# Patient Record
Sex: FEMALE | Race: BLACK OR AFRICAN AMERICAN | NOT HISPANIC OR LATINO
[De-identification: names, ages, dates, MRNs, and addresses within clinical notes are randomized per-mention and may not be internally consistent; named-entity substitution may affect disease eponyms.]

---

## 2017-04-13 ENCOUNTER — APPOINTMENT (OUTPATIENT)
Dept: ORTHOPEDIC SURGERY | Facility: CLINIC | Age: 57
End: 2017-04-13

## 2017-04-13 VITALS — HEIGHT: 65 IN | SYSTOLIC BLOOD PRESSURE: 118 MMHG | DIASTOLIC BLOOD PRESSURE: 72 MMHG

## 2017-04-20 ENCOUNTER — APPOINTMENT (OUTPATIENT)
Dept: ORTHOPEDIC SURGERY | Facility: CLINIC | Age: 57
End: 2017-04-20

## 2017-04-20 VITALS — HEART RATE: 102 BPM | HEIGHT: 65 IN | SYSTOLIC BLOOD PRESSURE: 148 MMHG | DIASTOLIC BLOOD PRESSURE: 83 MMHG

## 2017-04-27 ENCOUNTER — APPOINTMENT (OUTPATIENT)
Dept: ORTHOPEDIC SURGERY | Facility: CLINIC | Age: 57
End: 2017-04-27

## 2017-04-27 VITALS — DIASTOLIC BLOOD PRESSURE: 84 MMHG | SYSTOLIC BLOOD PRESSURE: 136 MMHG | HEART RATE: 90 BPM

## 2018-03-29 ENCOUNTER — APPOINTMENT (OUTPATIENT)
Dept: ORTHOPEDIC SURGERY | Facility: CLINIC | Age: 58
End: 2018-03-29

## 2018-04-06 ENCOUNTER — APPOINTMENT (OUTPATIENT)
Dept: ORTHOPEDIC SURGERY | Facility: CLINIC | Age: 58
End: 2018-04-06
Payer: MEDICARE

## 2018-04-06 VITALS
DIASTOLIC BLOOD PRESSURE: 78 MMHG | WEIGHT: 240 LBS | SYSTOLIC BLOOD PRESSURE: 122 MMHG | BODY MASS INDEX: 39.99 KG/M2 | HEIGHT: 65 IN | HEART RATE: 90 BPM

## 2018-04-06 PROCEDURE — 72170 X-RAY EXAM OF PELVIS: CPT

## 2018-04-06 PROCEDURE — 73562 X-RAY EXAM OF KNEE 3: CPT | Mod: LT

## 2018-04-06 PROCEDURE — 99214 OFFICE O/P EST MOD 30 MIN: CPT

## 2018-04-26 ENCOUNTER — APPOINTMENT (OUTPATIENT)
Dept: ORTHOPEDIC SURGERY | Facility: CLINIC | Age: 58
End: 2018-04-26

## 2018-05-03 ENCOUNTER — APPOINTMENT (OUTPATIENT)
Dept: ORTHOPEDIC SURGERY | Facility: CLINIC | Age: 58
End: 2018-05-03

## 2018-05-10 ENCOUNTER — APPOINTMENT (OUTPATIENT)
Dept: ORTHOPEDIC SURGERY | Facility: CLINIC | Age: 58
End: 2018-05-10

## 2018-07-20 ENCOUNTER — APPOINTMENT (OUTPATIENT)
Dept: ORTHOPEDIC SURGERY | Facility: CLINIC | Age: 58
End: 2018-07-20
Payer: MEDICARE

## 2018-07-20 VITALS
SYSTOLIC BLOOD PRESSURE: 128 MMHG | DIASTOLIC BLOOD PRESSURE: 77 MMHG | HEIGHT: 65 IN | BODY MASS INDEX: 39.99 KG/M2 | WEIGHT: 240 LBS | HEART RATE: 94 BPM

## 2018-07-20 PROCEDURE — 20610 DRAIN/INJ JOINT/BURSA W/O US: CPT | Mod: LT

## 2018-07-27 ENCOUNTER — APPOINTMENT (OUTPATIENT)
Dept: ORTHOPEDIC SURGERY | Facility: CLINIC | Age: 58
End: 2018-07-27

## 2018-08-02 ENCOUNTER — APPOINTMENT (OUTPATIENT)
Dept: ORTHOPEDIC SURGERY | Facility: CLINIC | Age: 58
End: 2018-08-02

## 2018-08-03 ENCOUNTER — APPOINTMENT (OUTPATIENT)
Dept: ORTHOPEDIC SURGERY | Facility: CLINIC | Age: 58
End: 2018-08-03

## 2018-08-09 ENCOUNTER — APPOINTMENT (OUTPATIENT)
Dept: ORTHOPEDIC SURGERY | Facility: CLINIC | Age: 58
End: 2018-08-09
Payer: MEDICARE

## 2018-08-09 PROCEDURE — 20610 DRAIN/INJ JOINT/BURSA W/O US: CPT | Mod: LT

## 2018-08-16 ENCOUNTER — APPOINTMENT (OUTPATIENT)
Dept: ORTHOPEDIC SURGERY | Facility: CLINIC | Age: 58
End: 2018-08-16
Payer: MEDICARE

## 2018-08-16 PROCEDURE — 20610 DRAIN/INJ JOINT/BURSA W/O US: CPT | Mod: LT

## 2020-01-29 ENCOUNTER — NON-APPOINTMENT (OUTPATIENT)
Age: 60
End: 2020-01-29

## 2020-11-13 ENCOUNTER — APPOINTMENT (OUTPATIENT)
Dept: SURGICAL ONCOLOGY | Facility: CLINIC | Age: 60
End: 2020-11-13
Payer: MEDICAID

## 2020-11-13 VITALS
RESPIRATION RATE: 15 BRPM | OXYGEN SATURATION: 95 % | BODY MASS INDEX: 39.99 KG/M2 | HEART RATE: 108 BPM | DIASTOLIC BLOOD PRESSURE: 73 MMHG | WEIGHT: 240 LBS | HEIGHT: 65 IN | SYSTOLIC BLOOD PRESSURE: 114 MMHG

## 2020-11-13 PROCEDURE — 99205 OFFICE O/P NEW HI 60 MIN: CPT

## 2020-11-20 ENCOUNTER — OUTPATIENT (OUTPATIENT)
Dept: OUTPATIENT SERVICES | Facility: HOSPITAL | Age: 60
LOS: 1 days | End: 2020-11-20
Payer: MEDICAID

## 2020-11-20 ENCOUNTER — RESULT REVIEW (OUTPATIENT)
Age: 60
End: 2020-11-20

## 2020-11-20 DIAGNOSIS — C50.919 MALIGNANT NEOPLASM OF UNSPECIFIED SITE OF UNSPECIFIED FEMALE BREAST: ICD-10-CM

## 2020-11-20 PROCEDURE — 88321 CONSLTJ&REPRT SLD PREP ELSWR: CPT

## 2020-11-22 LAB — SURGICAL PATHOLOGY STUDY: SIGNIFICANT CHANGE UP

## 2020-11-27 ENCOUNTER — RESULT REVIEW (OUTPATIENT)
Age: 60
End: 2020-11-27

## 2020-11-27 ENCOUNTER — APPOINTMENT (OUTPATIENT)
Dept: MAMMOGRAPHY | Facility: IMAGING CENTER | Age: 60
End: 2020-11-27

## 2020-11-27 ENCOUNTER — OUTPATIENT (OUTPATIENT)
Dept: OUTPATIENT SERVICES | Facility: HOSPITAL | Age: 60
LOS: 1 days | End: 2020-11-27
Payer: COMMERCIAL

## 2020-11-27 DIAGNOSIS — D05.12 INTRADUCTAL CARCINOMA IN SITU OF LEFT BREAST: ICD-10-CM

## 2020-11-27 PROCEDURE — 88321 CONSLTJ&REPRT SLD PREP ELSWR: CPT

## 2020-11-29 NOTE — CONSULT LETTER
[Consult Letter:] : I had the pleasure of evaluating your patient, [unfilled]. [Please see my note below.] : Please see my note below. [Sincerely,] : Sincerely, [Dear  ___] : Dear  [unfilled], [FreeTextEntry3] : Clarence Cabello MD FACS\par

## 2020-11-29 NOTE — PHYSICAL EXAM
[Normal] : supple, no neck mass and thyroid not enlarged [Normal Neck Lymph Nodes] : normal neck lymph nodes  [Normal Supraclavicular Lymph Nodes] : normal supraclavicular lymph nodes [Normal Groin Lymph Nodes] : normal groin lymph nodes [Normal Axillary Lymph Nodes] : normal axillary lymph nodes [Normal] : oriented to person, place and time, with appropriate affect [de-identified] : No masses or adenopathy bilaterally

## 2020-11-29 NOTE — HISTORY OF PRESENT ILLNESS
[de-identified] : Ms. Reyes is a 59 y/o female who presents for an initial consultation for newly diagnosed left breast DCIS.\par \par Recent mammogram from 20 revealed suspicious calcifications in the upper inner quadrant. Stereotactic biopsy is recommended.  BI-RADS Category 4: suspicious\par \par Pathology report 10/7/20:\par Breast, core biopsies-\par A. Ductal carcinoma in situ, high grade, cribriform and micropapillary types, associated with calcifications.\par Fibrocystic changes associated with focal calcifications.\par B. Fibrocystic changes associated with calcium oxalate. \par \par No family history of breast or ovarian cancer.\par No prior breast biopsies.\par PMHx:\par Past brain stem surgery \par Family history of HTN\par Family history of breast cancer- 2nd cousin\par Menarche: 12 y/o\par LMP: N/A\par \par \par \par

## 2020-11-29 NOTE — ASSESSMENT
[FreeTextEntry1] : Left breast DCIS\par History of brain stem tumor\par I had a long discussion with the pt and her daughter regarding her diagnosis, prognosis and all management options.\par Recommend let breast lumpectomy under leslie  localization \par I had a long discussion with the pt and her daughter about all surgical options. \par Procedure discussed in detail\par All questions answered\par

## 2020-11-30 LAB — SURGICAL PATHOLOGY STUDY: SIGNIFICANT CHANGE UP

## 2020-12-01 ENCOUNTER — APPOINTMENT (OUTPATIENT)
Dept: SURGICAL ONCOLOGY | Facility: HOSPITAL | Age: 60
End: 2020-12-01

## 2020-12-11 ENCOUNTER — NON-APPOINTMENT (OUTPATIENT)
Age: 60
End: 2020-12-11

## 2021-01-05 ENCOUNTER — OUTPATIENT (OUTPATIENT)
Dept: OUTPATIENT SERVICES | Facility: HOSPITAL | Age: 61
LOS: 1 days | End: 2021-01-05
Payer: COMMERCIAL

## 2021-01-05 VITALS
SYSTOLIC BLOOD PRESSURE: 132 MMHG | DIASTOLIC BLOOD PRESSURE: 79 MMHG | RESPIRATION RATE: 16 BRPM | HEIGHT: 64 IN | OXYGEN SATURATION: 95 % | TEMPERATURE: 98 F | WEIGHT: 275.58 LBS | HEART RATE: 84 BPM

## 2021-01-05 DIAGNOSIS — G93.0 CEREBRAL CYSTS: Chronic | ICD-10-CM

## 2021-01-05 DIAGNOSIS — Z98.2 PRESENCE OF CEREBROSPINAL FLUID DRAINAGE DEVICE: Chronic | ICD-10-CM

## 2021-01-05 DIAGNOSIS — D05.10 INTRADUCTAL CARCINOMA IN SITU OF UNSPECIFIED BREAST: ICD-10-CM

## 2021-01-05 DIAGNOSIS — D05.12 INTRADUCTAL CARCINOMA IN SITU OF LEFT BREAST: ICD-10-CM

## 2021-01-05 DIAGNOSIS — Z01.818 ENCOUNTER FOR OTHER PREPROCEDURAL EXAMINATION: ICD-10-CM

## 2021-01-05 LAB
ALBUMIN SERPL ELPH-MCNC: 3.9 G/DL — SIGNIFICANT CHANGE UP (ref 3.3–5)
ALP SERPL-CCNC: 84 U/L — SIGNIFICANT CHANGE UP (ref 40–120)
ALT FLD-CCNC: 20 U/L — SIGNIFICANT CHANGE UP (ref 10–45)
ANION GAP SERPL CALC-SCNC: 9 MMOL/L — SIGNIFICANT CHANGE UP (ref 5–17)
AST SERPL-CCNC: 16 U/L — SIGNIFICANT CHANGE UP (ref 10–40)
BILIRUB SERPL-MCNC: 0.4 MG/DL — SIGNIFICANT CHANGE UP (ref 0.2–1.2)
BUN SERPL-MCNC: 16 MG/DL — SIGNIFICANT CHANGE UP (ref 7–23)
CALCIUM SERPL-MCNC: 9.4 MG/DL — SIGNIFICANT CHANGE UP (ref 8.4–10.5)
CHLORIDE SERPL-SCNC: 105 MMOL/L — SIGNIFICANT CHANGE UP (ref 96–108)
CO2 SERPL-SCNC: 29 MMOL/L — SIGNIFICANT CHANGE UP (ref 22–31)
CREAT SERPL-MCNC: 1.01 MG/DL — SIGNIFICANT CHANGE UP (ref 0.5–1.3)
GLUCOSE SERPL-MCNC: 94 MG/DL — SIGNIFICANT CHANGE UP (ref 70–99)
HCT VFR BLD CALC: 40.9 % — SIGNIFICANT CHANGE UP (ref 34.5–45)
HGB BLD-MCNC: 12.9 G/DL — SIGNIFICANT CHANGE UP (ref 11.5–15.5)
MCHC RBC-ENTMCNC: 29.9 PG — SIGNIFICANT CHANGE UP (ref 27–34)
MCHC RBC-ENTMCNC: 31.5 GM/DL — LOW (ref 32–36)
MCV RBC AUTO: 94.9 FL — SIGNIFICANT CHANGE UP (ref 80–100)
NRBC # BLD: 0 /100 WBCS — SIGNIFICANT CHANGE UP (ref 0–0)
PLATELET # BLD AUTO: 312 K/UL — SIGNIFICANT CHANGE UP (ref 150–400)
POTASSIUM SERPL-MCNC: 4.1 MMOL/L — SIGNIFICANT CHANGE UP (ref 3.5–5.3)
POTASSIUM SERPL-SCNC: 4.1 MMOL/L — SIGNIFICANT CHANGE UP (ref 3.5–5.3)
PROT SERPL-MCNC: 8.2 G/DL — SIGNIFICANT CHANGE UP (ref 6–8.3)
RBC # BLD: 4.31 M/UL — SIGNIFICANT CHANGE UP (ref 3.8–5.2)
RBC # FLD: 14.6 % — HIGH (ref 10.3–14.5)
SODIUM SERPL-SCNC: 143 MMOL/L — SIGNIFICANT CHANGE UP (ref 135–145)
WBC # BLD: 9.5 K/UL — SIGNIFICANT CHANGE UP (ref 3.8–10.5)
WBC # FLD AUTO: 9.5 K/UL — SIGNIFICANT CHANGE UP (ref 3.8–10.5)

## 2021-01-05 PROCEDURE — 93010 ELECTROCARDIOGRAM REPORT: CPT | Mod: NC

## 2021-01-05 PROCEDURE — 85027 COMPLETE CBC AUTOMATED: CPT

## 2021-01-05 PROCEDURE — 36415 COLL VENOUS BLD VENIPUNCTURE: CPT

## 2021-01-05 PROCEDURE — 80053 COMPREHEN METABOLIC PANEL: CPT

## 2021-01-05 PROCEDURE — 93005 ELECTROCARDIOGRAM TRACING: CPT

## 2021-01-05 PROCEDURE — G0463: CPT

## 2021-01-05 NOTE — H&P PST ADULT - NEGATIVE NEUROLOGICAL SYMPTOMS
no transient paralysis/no paresthesias/no generalized seizures/no focal seizures/no syncope/no tremors/no vertigo/no loss of sensation/no loss of consciousness/no hemiparesis/no confusion/no facial palsy

## 2021-01-05 NOTE — H&P PST ADULT - NSICDXPROBLEM_GEN_ALL_CORE_FT
PROBLEM DIAGNOSES  Problem: Intraductal carcinoma of breast  Assessment and Plan: Preop instructions provided including npo status, Hibiclens wash for infection control. Pt to c/w current meds, take bp meds in am dos, Pt. aware to stop any NSAIDS, OTC herbals or MVI on 1/12/21. Verbilized understanding. BW done, pending results. DVT prophylasix as per primary team. MC pending, form provided. will make appt. Aware to f/u on covid testing prior to sx as per pst protocol and will make appt. JB precautions/allergy norified to team.

## 2021-01-05 NOTE — H&P PST ADULT - NEGATIVE GENERAL SYMPTOMS
no fever/no chills/no sweating/no anorexia/no weight loss/no polyphagia/no polyuria/no polydipsia/no malaise/no fatigue

## 2021-01-05 NOTE — H&P PST ADULT - NSANTHOSAYNRD_GEN_A_CORE
Denies sleep studies done in the past/No. JB screening performed.  STOP BANG Legend: 0-2 = LOW Risk; 3-4 = INTERMEDIATE Risk; 5-8 = HIGH Risk

## 2021-01-05 NOTE — H&P PST ADULT - HISTORY OF PRESENT ILLNESS
y/o F presents to PST w/ a preop dx of intraductal carcinoma in situ of breast and to be evaluated for a scheduled left breast lumpectomy w/ leslie  localization on 1/19/21. Pt states had a mammo last 10/2020 and a lump noted, breast bx done and suspicious, pt referred to Dr Cabello who recommended surgical intervention at this time.  61 y/o F presents to PST w/ a preop dx of intraductal carcinoma in situ of breast and to be evaluated for a scheduled left breast lumpectomy w/ leslie  localization on 1/19/21. Pt states had a mammo last 10/2020 and a lump noted, breast bx done and suspicious, pt referred to Dr Cabello who recommended surgical intervention at this time.

## 2021-01-05 NOTE — H&P PST ADULT - COMMENTS
7/2020 Denies current covid S&S or in the past, test neg when done. Pt denies history of travel outside the country and outside [place] and denies COVID19 positive contacts within the last 14 days.

## 2021-01-05 NOTE — H&P PST ADULT - RS GEN PE MLT RESP DETAILS PC
airway patent/breath sounds equal/clear to auscultation bilaterally/no chest wall tenderness/no rales/no rhonchi/no subcutaneous emphysema/no wheezes snoring and deep respirations noted, States " I am fat that's why I breath like this"/airway patent/breath sounds equal/clear to auscultation bilaterally/no chest wall tenderness/no rales/no rhonchi/no subcutaneous emphysema/no wheezes/respirations labored

## 2021-01-12 ENCOUNTER — RESULT REVIEW (OUTPATIENT)
Age: 61
End: 2021-01-12

## 2021-01-12 ENCOUNTER — OUTPATIENT (OUTPATIENT)
Dept: OUTPATIENT SERVICES | Facility: HOSPITAL | Age: 61
LOS: 1 days | End: 2021-01-12
Payer: MEDICAID

## 2021-01-12 ENCOUNTER — APPOINTMENT (OUTPATIENT)
Dept: MAMMOGRAPHY | Facility: IMAGING CENTER | Age: 61
End: 2021-01-12
Payer: MEDICARE

## 2021-01-12 DIAGNOSIS — G93.0 CEREBRAL CYSTS: Chronic | ICD-10-CM

## 2021-01-12 DIAGNOSIS — Z00.8 ENCOUNTER FOR OTHER GENERAL EXAMINATION: ICD-10-CM

## 2021-01-12 DIAGNOSIS — Z98.2 PRESENCE OF CEREBROSPINAL FLUID DRAINAGE DEVICE: Chronic | ICD-10-CM

## 2021-01-12 PROBLEM — Z86.69 PERSONAL HISTORY OF OTHER DISEASES OF THE NERVOUS SYSTEM AND SENSE ORGANS: Chronic | Status: ACTIVE | Noted: 2021-01-05

## 2021-01-12 PROBLEM — I10 ESSENTIAL (PRIMARY) HYPERTENSION: Chronic | Status: ACTIVE | Noted: 2021-01-05

## 2021-01-12 PROCEDURE — C1739: CPT

## 2021-01-12 PROCEDURE — 19281 PERQ DEVICE BREAST 1ST IMAG: CPT

## 2021-01-12 PROCEDURE — 19281 PERQ DEVICE BREAST 1ST IMAG: CPT | Mod: LT

## 2021-01-16 ENCOUNTER — APPOINTMENT (OUTPATIENT)
Dept: DISASTER EMERGENCY | Facility: CLINIC | Age: 61
End: 2021-01-16

## 2021-01-18 ENCOUNTER — TRANSCRIPTION ENCOUNTER (OUTPATIENT)
Age: 61
End: 2021-01-18

## 2021-01-18 LAB — SARS-COV-2 N GENE NPH QL NAA+PROBE: NOT DETECTED

## 2021-01-19 ENCOUNTER — RESULT REVIEW (OUTPATIENT)
Age: 61
End: 2021-01-19

## 2021-01-19 ENCOUNTER — OUTPATIENT (OUTPATIENT)
Dept: OUTPATIENT SERVICES | Facility: HOSPITAL | Age: 61
LOS: 1 days | End: 2021-01-19
Payer: COMMERCIAL

## 2021-01-19 ENCOUNTER — APPOINTMENT (OUTPATIENT)
Dept: SURGICAL ONCOLOGY | Facility: HOSPITAL | Age: 61
End: 2021-01-19

## 2021-01-19 VITALS
HEART RATE: 81 BPM | RESPIRATION RATE: 16 BRPM | SYSTOLIC BLOOD PRESSURE: 111 MMHG | TEMPERATURE: 98 F | DIASTOLIC BLOOD PRESSURE: 75 MMHG | OXYGEN SATURATION: 93 %

## 2021-01-19 VITALS
RESPIRATION RATE: 18 BRPM | OXYGEN SATURATION: 97 % | WEIGHT: 275.58 LBS | HEIGHT: 64 IN | DIASTOLIC BLOOD PRESSURE: 79 MMHG | HEART RATE: 91 BPM | SYSTOLIC BLOOD PRESSURE: 150 MMHG | TEMPERATURE: 99 F

## 2021-01-19 DIAGNOSIS — D05.12 INTRADUCTAL CARCINOMA IN SITU OF LEFT BREAST: ICD-10-CM

## 2021-01-19 DIAGNOSIS — G93.0 CEREBRAL CYSTS: Chronic | ICD-10-CM

## 2021-01-19 DIAGNOSIS — D05.10 INTRADUCTAL CARCINOMA IN SITU OF UNSPECIFIED BREAST: ICD-10-CM

## 2021-01-19 DIAGNOSIS — N63.20 UNSPECIFIED LUMP IN THE LEFT BREAST, UNSPECIFIED QUADRANT: ICD-10-CM

## 2021-01-19 DIAGNOSIS — Z98.2 PRESENCE OF CEREBROSPINAL FLUID DRAINAGE DEVICE: Chronic | ICD-10-CM

## 2021-01-19 PROCEDURE — 14001 TIS TRNFR TRUNK 10.1-30SQCM: CPT

## 2021-01-19 PROCEDURE — 19301 PARTIAL MASTECTOMY: CPT

## 2021-01-19 PROCEDURE — 76098 X-RAY EXAM SURGICAL SPECIMEN: CPT

## 2021-01-19 PROCEDURE — 88305 TISSUE EXAM BY PATHOLOGIST: CPT | Mod: 26

## 2021-01-19 PROCEDURE — 19301 PARTIAL MASTECTOMY: CPT | Mod: LT

## 2021-01-19 PROCEDURE — 88305 TISSUE EXAM BY PATHOLOGIST: CPT

## 2021-01-19 PROCEDURE — 76098 X-RAY EXAM SURGICAL SPECIMEN: CPT | Mod: 26

## 2021-01-19 PROCEDURE — 88307 TISSUE EXAM BY PATHOLOGIST: CPT | Mod: 26

## 2021-01-19 PROCEDURE — 88307 TISSUE EXAM BY PATHOLOGIST: CPT

## 2021-01-19 RX ORDER — HYDROMORPHONE HYDROCHLORIDE 2 MG/ML
0.5 INJECTION INTRAMUSCULAR; INTRAVENOUS; SUBCUTANEOUS
Refills: 0 | Status: DISCONTINUED | OUTPATIENT
Start: 2021-01-19 | End: 2021-01-19

## 2021-01-19 RX ORDER — LISINOPRIL 2.5 MG/1
1 TABLET ORAL
Qty: 0 | Refills: 0 | DISCHARGE

## 2021-01-19 RX ORDER — NIFEDIPINE 30 MG
1 TABLET, EXTENDED RELEASE 24 HR ORAL
Qty: 0 | Refills: 0 | DISCHARGE

## 2021-01-19 RX ORDER — SODIUM CHLORIDE 9 MG/ML
1000 INJECTION, SOLUTION INTRAVENOUS
Refills: 0 | Status: DISCONTINUED | OUTPATIENT
Start: 2021-01-19 | End: 2021-01-19

## 2021-01-19 RX ORDER — ACETAMINOPHEN 500 MG
2 TABLET ORAL
Qty: 0 | Refills: 0 | DISCHARGE

## 2021-01-19 RX ORDER — ONDANSETRON 8 MG/1
4 TABLET, FILM COATED ORAL ONCE
Refills: 0 | Status: DISCONTINUED | OUTPATIENT
Start: 2021-01-19 | End: 2021-01-19

## 2021-01-19 RX ADMIN — SODIUM CHLORIDE 50 MILLILITER(S): 9 INJECTION, SOLUTION INTRAVENOUS at 11:57

## 2021-01-19 NOTE — ASU DISCHARGE PLAN (ADULT/PEDIATRIC) - CARE PROVIDER_API CALL
Clarence Cabello)  Surgery  [address] [place] [address]  Phone: [phone]  Fax: [phone]  Follow Up Time: 1 week

## 2021-01-19 NOTE — BRIEF OPERATIVE NOTE - NSICDXBRIEFPOSTOP_GEN_ALL_CORE_FT
POST-OP DIAGNOSIS:  Ductal carcinoma in situ (DCIS) of left breast 19-Jan-2021 11:44:15  Ghislaine Morales  Left breast mass 19-Jan-2021 11:41:51  Ghislaine Morales

## 2021-01-19 NOTE — BRIEF OPERATIVE NOTE - NSICDXBRIEFPREOP_GEN_ALL_CORE_FT
PRE-OP DIAGNOSIS:  Ductal carcinoma in situ (DCIS) of left breast 19-Jan-2021 11:43:35  Ghislaine Morales  Left breast mass 19-Jan-2021 11:41:39  Ghislaine Morales

## 2021-01-19 NOTE — ASU DISCHARGE PLAN (ADULT/PEDIATRIC) - CALL YOUR DOCTOR IF YOU HAVE ANY OF THE FOLLOWING:
Bleeding that does not stop/Pain not relieved by Medications/Fever greater than (need to indicate Fahrenheit or Celsius)/Wound/Surgical Site with redness, or foul smelling discharge or pus/Nausea and vomiting that does not stop Bleeding that does not stop/Swelling that gets worse/Pain not relieved by Medications/Fever greater than (need to indicate Fahrenheit or Celsius)/Wound/Surgical Site with redness, or foul smelling discharge or pus/Nausea and vomiting that does not stop

## 2021-01-22 PROBLEM — M19.90 UNSPECIFIED OSTEOARTHRITIS, UNSPECIFIED SITE: Chronic | Status: ACTIVE | Noted: 2021-01-19

## 2021-01-22 PROBLEM — G47.30 SLEEP APNEA, UNSPECIFIED: Chronic | Status: ACTIVE | Noted: 2021-01-19

## 2021-01-24 LAB — SURGICAL PATHOLOGY STUDY: SIGNIFICANT CHANGE UP

## 2021-01-29 ENCOUNTER — APPOINTMENT (OUTPATIENT)
Dept: SURGICAL ONCOLOGY | Facility: CLINIC | Age: 61
End: 2021-01-29
Payer: MEDICARE

## 2021-01-29 VITALS
BODY MASS INDEX: 39.99 KG/M2 | HEART RATE: 103 BPM | RESPIRATION RATE: 15 BRPM | WEIGHT: 240 LBS | SYSTOLIC BLOOD PRESSURE: 140 MMHG | DIASTOLIC BLOOD PRESSURE: 84 MMHG | HEIGHT: 65 IN | OXYGEN SATURATION: 95 %

## 2021-01-29 PROCEDURE — 99024 POSTOP FOLLOW-UP VISIT: CPT

## 2021-01-29 NOTE — REVIEW OF SYSTEMS
Phone call to mother, note faxed to [place] Dunwello and his work. Frieda Amin RN 3/15/2019 4:09 PM       [Negative] : Heme/Lymph

## 2021-01-30 NOTE — HISTORY OF PRESENT ILLNESS
[de-identified] : Ms. Reyes is a 59 y/o female who presents for an initial postop visit.  She was initially seen in consultation o 20 for newly diagnosed left breast DCIS.\par She is status post left breast lumpectomy on 21 for DCIS.  \par Final pathology revealed no residual DCIS with biopsy site changes.\par DCIS noted on the core biopsy was ER/WA+.\par \par Mammogram from 20 revealed suspicious calcifications in the upper inner quadrant. Stereotactic biopsy is recommended.  BI-RADS Category 4: suspicious\par \par Pathology report 10/7/20:\par Breast, core biopsies-\par A. Ductal carcinoma in situ, high grade, cribriform and micropapillary types, associated with calcifications.\par Fibrocystic changes associated with focal calcifications.\par B. Fibrocystic changes associated with calcium oxalate. \par \par No family history of breast or ovarian cancer.\par No prior breast biopsies.\par PMHx:\par Past brain stem surgery \par Family history of HTN\par Family history of breast cancer- 2nd cousin\par Menarche: 12 y/o\par LMP: N/A\par \par \par \par

## 2021-01-30 NOTE — ASSESSMENT
[FreeTextEntry1] : Left breast DCIS ER/DC+.\par Status post left breast lumpectomy on 1/19/21.  \par No residual DCIS on final pathology\par Will refer to medical oncology to discuss risk reduction hormonal therapy and to radiation oncology to discuss adjuvant radiation therapy\par Oncotype DCIS ordered\par RTO 3 months\par \par Enclosed pathology report

## 2021-02-11 ENCOUNTER — OUTPATIENT (OUTPATIENT)
Dept: OUTPATIENT SERVICES | Facility: HOSPITAL | Age: 61
LOS: 1 days | Discharge: HOME | End: 2021-02-11

## 2021-02-11 DIAGNOSIS — G93.0 CEREBRAL CYSTS: Chronic | ICD-10-CM

## 2021-02-11 DIAGNOSIS — Z85.3 PERSONAL HISTORY OF MALIGNANT NEOPLASM OF BREAST: ICD-10-CM

## 2021-02-11 DIAGNOSIS — Z98.2 PRESENCE OF CEREBROSPINAL FLUID DRAINAGE DEVICE: Chronic | ICD-10-CM

## 2021-02-16 ENCOUNTER — APPOINTMENT (OUTPATIENT)
Dept: HEMATOLOGY ONCOLOGY | Facility: CLINIC | Age: 61
End: 2021-02-16
Payer: MEDICARE

## 2021-02-16 PROCEDURE — 99205 OFFICE O/P NEW HI 60 MIN: CPT | Mod: 95

## 2021-02-16 RX ORDER — DICLOFENAC SODIUM 75 MG/1
75 TABLET, DELAYED RELEASE ORAL
Qty: 30 | Refills: 0 | Status: DISCONTINUED | COMMUNITY
Start: 2018-04-06 | End: 2021-02-16

## 2021-02-16 RX ORDER — HYDROCHLOROTHIAZIDE 12.5 MG/1
TABLET ORAL
Refills: 0 | Status: ACTIVE | COMMUNITY

## 2021-02-16 RX ORDER — LISINOPRIL 30 MG/1
TABLET ORAL
Refills: 0 | Status: ACTIVE | COMMUNITY

## 2021-02-16 RX ORDER — HYALURONATE SODIUM 20 MG/2 ML
20 SYRINGE (ML) INTRAARTICULAR
Qty: 1 | Refills: 0 | Status: DISCONTINUED | OUTPATIENT
Start: 2018-04-06 | End: 2021-02-16

## 2021-02-17 ENCOUNTER — APPOINTMENT (OUTPATIENT)
Dept: RADIATION ONCOLOGY | Facility: CLINIC | Age: 61
End: 2021-02-17

## 2021-02-24 ENCOUNTER — APPOINTMENT (OUTPATIENT)
Dept: RADIATION ONCOLOGY | Facility: CLINIC | Age: 61
End: 2021-02-24

## 2021-02-24 ENCOUNTER — APPOINTMENT (OUTPATIENT)
Dept: RADIATION ONCOLOGY | Facility: CLINIC | Age: 61
End: 2021-02-24
Payer: MEDICARE

## 2021-02-24 DIAGNOSIS — I10 ESSENTIAL (PRIMARY) HYPERTENSION: ICD-10-CM

## 2021-02-24 DIAGNOSIS — Z86.03 PERSONAL HISTORY OF NEOPLASM OF UNCERTAIN BEHAVIOR: ICD-10-CM

## 2021-02-24 PROCEDURE — 99205 OFFICE O/P NEW HI 60 MIN: CPT | Mod: 95

## 2021-03-23 NOTE — PHYSICAL EXAM
[General Appearance - Alert] : alert [General Appearance - In No Acute Distress] : in no acute distress [] : no respiratory distress [Oriented To Time, Place, And Person] : oriented to person, place, and time [de-identified] : Not done telehealth

## 2021-03-23 NOTE — REVIEW OF SYSTEMS
[Joint Pain] : joint pain [Muscle Weakness] : muscle weakness [Gait Disturbance] : gait disturbance [Anxiety] : anxiety [Negative] : Constitutional [Eye Pain] : no eye pain [Loss of Hearing] : no loss of hearing [Chest Pain] : no chest pain [Palpitations] : no palpitations [Shortness Of Breath] : no shortness of breath [Cough] : no cough [Abdominal Pain] : no abdominal pain [Vomiting] : no vomiting [Constipation] : no constipation [Diarrhea] : no diarrhea [Urinary Frequency] : no change in urinary frequency [Swollen Glands] : no swollen glands [FreeTextEntry9] : ILENE

## 2021-03-23 NOTE — HISTORY OF PRESENT ILLNESS
[Home] : at home, [unfilled] , at the time of the visit. [Medical Office: ([facility])___] : at the medical office located in  [Other:____] : [unfilled] [Verbal consent obtained from patient] : the patient, [unfilled] [FreeTextEntry1] :  61 y/o female seen with daughter Krzysztof\par \par Diagnosis: core biopsy high grade DCIS (uncertain amount of disease and margins), no residual disease seen at lumpectomy\par \par Oncologic history:  mammogram and sonogram on 7/10/20 with additional imaging left breast on 9/4/20 that showed suspicious calcifications in the upper inner quadrant. L 10:00 BIRADS 4, Biopsy DCIS %/DE 80%+.\par \par Pathology report 10/7/20:\par Breast, core biopsies-\par A. Ductal carcinoma in situ, high grade, cribriform and micropapillary types, associated with calcifications.\par Fibrocystic changes associated with focal calcifications.\par B. Fibrocystic changes associated with calcium oxalate. \par \par 1/19/21: left breast lumpectomy:  Final pathology( reviewed by [facility])  showed  Breast, left 12:00, lumpectomy: No residual ductal carcinoma in situ identified.  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 Denies breast pain. Able to care for self and perform most ADLs. Uses walker occasionally\par

## 2021-04-07 ENCOUNTER — NON-APPOINTMENT (OUTPATIENT)
Age: 61
End: 2021-04-07

## 2021-04-08 ENCOUNTER — NON-APPOINTMENT (OUTPATIENT)
Age: 61
End: 2021-04-08

## 2021-04-28 ENCOUNTER — NON-APPOINTMENT (OUTPATIENT)
Age: 61
End: 2021-04-28

## 2021-05-10 ENCOUNTER — OUTPATIENT (OUTPATIENT)
Dept: OUTPATIENT SERVICES | Facility: HOSPITAL | Age: 61
LOS: 1 days | Discharge: HOME | End: 2021-05-10

## 2021-05-10 DIAGNOSIS — Z85.3 PERSONAL HISTORY OF MALIGNANT NEOPLASM OF BREAST: ICD-10-CM

## 2021-05-10 DIAGNOSIS — G93.0 CEREBRAL CYSTS: Chronic | ICD-10-CM

## 2021-05-10 DIAGNOSIS — Z98.2 PRESENCE OF CEREBROSPINAL FLUID DRAINAGE DEVICE: Chronic | ICD-10-CM

## 2021-05-11 ENCOUNTER — NON-APPOINTMENT (OUTPATIENT)
Age: 61
End: 2021-05-11

## 2021-05-13 ENCOUNTER — APPOINTMENT (OUTPATIENT)
Dept: HEMATOLOGY ONCOLOGY | Facility: CLINIC | Age: 61
End: 2021-05-13
Payer: MEDICARE

## 2021-05-20 ENCOUNTER — APPOINTMENT (OUTPATIENT)
Dept: HEMATOLOGY ONCOLOGY | Facility: CLINIC | Age: 61
End: 2021-05-20
Payer: MEDICARE

## 2021-05-20 PROCEDURE — 99213 OFFICE O/P EST LOW 20 MIN: CPT | Mod: 95

## 2021-07-07 ENCOUNTER — OUTPATIENT (OUTPATIENT)
Dept: OUTPATIENT SERVICES | Facility: HOSPITAL | Age: 61
LOS: 1 days | Discharge: HOME | End: 2021-07-07

## 2021-07-07 DIAGNOSIS — Z85.3 PERSONAL HISTORY OF MALIGNANT NEOPLASM OF BREAST: ICD-10-CM

## 2021-07-07 DIAGNOSIS — Z98.2 PRESENCE OF CEREBROSPINAL FLUID DRAINAGE DEVICE: Chronic | ICD-10-CM

## 2021-07-07 DIAGNOSIS — G93.0 CEREBRAL CYSTS: Chronic | ICD-10-CM

## 2021-07-08 ENCOUNTER — APPOINTMENT (OUTPATIENT)
Dept: HEMATOLOGY ONCOLOGY | Facility: CLINIC | Age: 61
End: 2021-07-08

## 2021-08-03 ENCOUNTER — APPOINTMENT (OUTPATIENT)
Dept: HEMATOLOGY ONCOLOGY | Facility: CLINIC | Age: 61
End: 2021-08-03
Payer: MEDICARE

## 2021-08-06 ENCOUNTER — APPOINTMENT (OUTPATIENT)
Dept: SURGICAL ONCOLOGY | Facility: CLINIC | Age: 61
End: 2021-08-06
Payer: MEDICARE

## 2021-08-06 VITALS
RESPIRATION RATE: 15 BRPM | HEART RATE: 86 BPM | BODY MASS INDEX: 40.82 KG/M2 | SYSTOLIC BLOOD PRESSURE: 145 MMHG | DIASTOLIC BLOOD PRESSURE: 84 MMHG | OXYGEN SATURATION: 98 % | WEIGHT: 245 LBS | HEIGHT: 65 IN

## 2021-08-06 PROCEDURE — 99215 OFFICE O/P EST HI 40 MIN: CPT

## 2021-08-06 NOTE — ADDENDUM
[FreeTextEntry1] : I, Zakiya Gamble, acted solely as a scribe for Dr. Clarence Cabello on this date 08/06/2021.\par

## 2021-08-06 NOTE — ASSESSMENT
[FreeTextEntry1] : Left breast DCIS ER/MA+\par CABRERA\par Area of concern likely scarring secondary to lumpectomy\par Reassured patient that index of suspicion for recurrence is low\par Will get yearly breast imaging September 2021\par Pt wants genetic testing which was performed today \par RTO 3 months \par

## 2021-08-06 NOTE — CONSULT LETTER
[Dear  ___] : Dear  [unfilled], [Courtesy Letter:] : I had the pleasure of seeing your patient, [unfilled], in my office today. [Please see my note below.] : Please see my note below. [Sincerely,] : Sincerely, [FreeTextEntry3] : Clarence Cabello MD FACS

## 2021-08-06 NOTE — HISTORY OF PRESENT ILLNESS
[de-identified] : Ms. Reyes is a 60 y/o female who presents for a follow-up visit.  She was initially seen in consultation on 11/13/20 for newly diagnosed left breast DCIS.\par She is status post left breast lumpectomy on 1/19/21 for DCIS.  \par Final pathology revealed no residual DCIS with biopsy site changes. (ER+/MO+)\par Oncotype Dx: 11\par Patient is currently on Anastrozole as per Dr. Painter- tolerating well. \par \par Mammogram from 9/4/20 revealed suspicious calcifications in the upper inner quadrant. Stereotactic biopsy is recommended.  BI-RADS Category 4: suspicious\par \par Pathology report 10/7/20:\par Breast, core biopsies-\par A. Ductal carcinoma in situ, high grade, cribriform and micropapillary types, associated with calcifications.\par Fibrocystic changes associated with focal calcifications.\par B. Fibrocystic changes associated with calcium oxalate. \par \par No prior breast biopsies.\par PMHx: Past brain stem surgery \par Family history of HTN\par Family history of breast cancer- 2nd cousin

## 2021-08-06 NOTE — PHYSICAL EXAM
[de-identified] : Left breast lumpectomy scar well healed. normal nodularity from scarring. us negative fo any solid or cystic lesions. no dominant masses or adenopathy bilaterally.

## 2021-08-10 ENCOUNTER — OUTPATIENT (OUTPATIENT)
Dept: OUTPATIENT SERVICES | Facility: HOSPITAL | Age: 61
LOS: 1 days | Discharge: HOME | End: 2021-08-10

## 2021-08-10 DIAGNOSIS — Z85.3 PERSONAL HISTORY OF MALIGNANT NEOPLASM OF BREAST: ICD-10-CM

## 2021-08-10 DIAGNOSIS — Z98.2 PRESENCE OF CEREBROSPINAL FLUID DRAINAGE DEVICE: Chronic | ICD-10-CM

## 2021-08-10 DIAGNOSIS — G93.0 CEREBRAL CYSTS: Chronic | ICD-10-CM

## 2021-08-12 ENCOUNTER — APPOINTMENT (OUTPATIENT)
Dept: HEMATOLOGY ONCOLOGY | Facility: CLINIC | Age: 61
End: 2021-08-12
Payer: MEDICARE

## 2021-08-12 VITALS
WEIGHT: 240 LBS | TEMPERATURE: 98 F | BODY MASS INDEX: 39.94 KG/M2 | SYSTOLIC BLOOD PRESSURE: 149 MMHG | HEART RATE: 112 BPM | OXYGEN SATURATION: 93 % | RESPIRATION RATE: 16 BRPM | DIASTOLIC BLOOD PRESSURE: 87 MMHG

## 2021-08-12 PROCEDURE — 99213 OFFICE O/P EST LOW 20 MIN: CPT

## 2021-11-12 ENCOUNTER — NON-APPOINTMENT (OUTPATIENT)
Age: 61
End: 2021-11-12

## 2021-12-27 ENCOUNTER — OUTPATIENT (OUTPATIENT)
Dept: OUTPATIENT SERVICES | Facility: HOSPITAL | Age: 61
LOS: 1 days | Discharge: HOME | End: 2021-12-27

## 2021-12-27 DIAGNOSIS — G93.0 CEREBRAL CYSTS: Chronic | ICD-10-CM

## 2021-12-27 DIAGNOSIS — Z98.2 PRESENCE OF CEREBROSPINAL FLUID DRAINAGE DEVICE: Chronic | ICD-10-CM

## 2021-12-27 DIAGNOSIS — Z85.3 PERSONAL HISTORY OF MALIGNANT NEOPLASM OF BREAST: ICD-10-CM

## 2021-12-28 ENCOUNTER — APPOINTMENT (OUTPATIENT)
Dept: HEMATOLOGY ONCOLOGY | Facility: CLINIC | Age: 61
End: 2021-12-28
Payer: MEDICARE

## 2021-12-28 ENCOUNTER — APPOINTMENT (OUTPATIENT)
Dept: HEMATOLOGY ONCOLOGY | Facility: CLINIC | Age: 61
End: 2021-12-28

## 2021-12-30 ENCOUNTER — APPOINTMENT (OUTPATIENT)
Dept: HEMATOLOGY ONCOLOGY | Facility: CLINIC | Age: 61
End: 2021-12-30
Payer: MEDICARE

## 2021-12-30 PROCEDURE — 99215 OFFICE O/P EST HI 40 MIN: CPT | Mod: 95

## 2021-12-30 RX ORDER — ANASTROZOLE TABLETS 1 MG/1
1 TABLET ORAL DAILY
Qty: 30 | Refills: 3 | Status: DISCONTINUED | COMMUNITY
Start: 2021-02-16 | End: 2021-12-30

## 2021-12-30 NOTE — ASSESSMENT
[FreeTextEntry1] : 60 y/o woman with HTN, prior brainstem tumor 1991 resected, OA, now with L DCIS ER+IL+ s/p lumpectomy 1/19/21, doing well, presenting for follow up on adjuvant AI, doing well.\par \par -continue exemestane daily, doing well.\par -no symptoms suggestive of recurrence\par -continue to follow up with her breast surgeon Dr. Cabello  \par -next mammo/US scheduled for 1/3/2022\par -will get labs next visit\par -follow up in 2/2022 for clinical breast exam\par -I encouraged her to call me with any questions.

## 2021-12-30 NOTE — HISTORY OF PRESENT ILLNESS
[de-identified] : The patient had a routine mammogram and sonogram on 7/10/20 with additional imaging left breast on 9/4/20 that showed suspicious calcifications in the upper inner quadrant. L 10:00 biopsy DCIS %/[place] 80%+.\par \par Pathology report 10/7/20:\par Breast, core biopsies-\par A. Ductal carcinoma in situ, high grade, cribriform and micropapillary types, associated with calcifications.\par Fibrocystic changes associated with focal calcifications.\par B. Fibrocystic changes associated with calcium oxalate. \par \par She is status post left breast lumpectomy with Dr. Clarence Cabello on 1/19/21 for DCIS.  \par Final pathology revealed no residual DCIS with biopsy site changes.\par \par The patient met with radiation oncologist, low DCISion RT, therefore patient chose to defer radiation therapy.  \par \par Anastrozole started 4/2021.\par \par Family history of breast cancer- 2nd cousin. 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 [de-identified] : ER+ (100%)/AZ+ (80%) [de-identified] : \par Transfer of care.\par Because of Covid 19 pandemic we agreed to doing a virtual visit  today. The visit was conducted on [employer] platform.\par Verbal consent given on 12/28/2021 at 10:40AM  by YASIR MARIE, patient. \par Started endocrine therapy 4/2021, switched to exemestane since 8/12/21.  She states she is tolerating well with no complaints.  \par Left knee osteoarthritis slightly worse, was recommended to have knee replacement.  \par last saw Dr. Cabello 8/6/21, area of concern likely scarring secondary to lumpectomy \par mammo/US sched for 1/3/2021\par No new breast lumps or skin changes. \par Labs next visit\par \par HEALTH MAINTENANCE \par PCP Dr. Ramires, following for HTN\par ORTHO OA - gets knee injection every 6 months, not taking any oral medications for this currently\par Gyn: Dr. 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 LMP: "years ago"\par s/p COVID vaccine and booster

## 2022-01-06 ENCOUNTER — APPOINTMENT (OUTPATIENT)
Dept: GASTROENTEROLOGY | Facility: CLINIC | Age: 62
End: 2022-01-06

## 2022-02-18 ENCOUNTER — OUTPATIENT (OUTPATIENT)
Dept: OUTPATIENT SERVICES | Facility: HOSPITAL | Age: 62
LOS: 1 days | Discharge: HOME | End: 2022-02-18

## 2022-02-18 DIAGNOSIS — G93.0 CEREBRAL CYSTS: Chronic | ICD-10-CM

## 2022-02-18 DIAGNOSIS — Z98.2 PRESENCE OF CEREBROSPINAL FLUID DRAINAGE DEVICE: Chronic | ICD-10-CM

## 2022-02-18 DIAGNOSIS — Z85.3 PERSONAL HISTORY OF MALIGNANT NEOPLASM OF BREAST: ICD-10-CM

## 2022-02-22 ENCOUNTER — APPOINTMENT (OUTPATIENT)
Dept: HEMATOLOGY ONCOLOGY | Facility: CLINIC | Age: 62
End: 2022-02-22
Payer: MEDICARE

## 2022-02-22 VITALS
SYSTOLIC BLOOD PRESSURE: 119 MMHG | RESPIRATION RATE: 18 BRPM | OXYGEN SATURATION: 95 % | TEMPERATURE: 97.8 F | BODY MASS INDEX: 40.98 KG/M2 | HEIGHT: 65 IN | DIASTOLIC BLOOD PRESSURE: 75 MMHG | HEART RATE: 105 BPM | WEIGHT: 245.99 LBS

## 2022-02-22 PROCEDURE — 99214 OFFICE O/P EST MOD 30 MIN: CPT

## 2022-02-22 RX ORDER — NIFEDIPINE 90 MG/1
90 TABLET, EXTENDED RELEASE ORAL
Qty: 90 | Refills: 0 | Status: ACTIVE | COMMUNITY
Start: 2022-01-24

## 2022-02-22 NOTE — REVIEW OF SYSTEMS
[Negative] : Allergic/Immunologic [Joint Pain] : joint pain [FreeTextEntry9] : left knee pain - chronic

## 2022-02-22 NOTE — HISTORY OF PRESENT ILLNESS
[Disease: _____________________] : Disease: [unfilled] [T: ___] : T[unfilled] [AJCC Stage: ____] : AJCC Stage: [unfilled] [de-identified] : The patient had a routine mammogram and sonogram on 7/10/20 with additional imaging left breast on 9/4/20 that showed suspicious calcifications in the upper inner quadrant. L 10:00 biopsy DCIS %/[place] 80%+.\par \par Pathology report 10/7/20:\par Breast, core biopsies-\par A. Ductal carcinoma in situ, high grade, cribriform and micropapillary types, associated with calcifications.\par Fibrocystic changes associated with focal calcifications.\par B. Fibrocystic changes associated with calcium oxalate. \par \par She is status post left breast lumpectomy with Dr. Clarence Cabello on 1/19/21 for DCIS.  \par Final pathology revealed no residual DCIS with biopsy site changes.\par \par The patient met with radiation oncologist, low DCISion RT, therefore patient chose to defer radiation therapy.  \par \par Anastrozole started 4/2021 and then switched to exemestane b/c of dizziness and arthralgias. \par \par Family history of breast cancer- 2nd cousin. 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 [de-identified] : ER+ (100%)/PA+ (80%) [de-identified] : 2/22/22\par Patient presents today to rule out metastatic breast cancer and assess treatment toxicity - exemestane\par Started endocrine therapy 4/2021, switched to exemestane since 8/12/21.  She states she is tolerating well with no complaints.  \par Left knee osteoarthritis slightly worse, was recommended to have knee replacement - will not be doing\par last saw Dr. Cabello 8/6/21, area of concern likely scarring secondary to lumpectomy \par mammo/US sched for 3/2022\par No new breast lumps or skin changes. \par Patient denies any SOB, CP, abdominal pain, bone pain or headache.\par Patient denies any hotflashes, arthralgias, vaginal dryness, vaginal bleeding, hair loss, muscle cramps.\par Labs done w/ Pcp in January of 2022 - will obtain copy \par \par HEALTH MAINTENANCE \par PCP Dr. Ramires, following for HTN - bw done last mnth\par ORTHO OA - gets knee injection every 6 months, not taking any oral medications for this currently\par Gyn: Dr. Sears - sees annually, no vaginal bleeding or spotting\par BMD 7/16/21 normal\par GI colonoscopy never had one - consult scheduled w/ Dr. 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 LMP: "years ago"\par s/p COVID vaccine and booster

## 2022-02-22 NOTE — ASSESSMENT
[FreeTextEntry1] : 62 y/o woman with HTN, prior brainstem tumor 1991 resected, OA, now with L DCIS ER+OK+ s/p lumpectomy 1/19/21, doing well, presenting for follow up on adjuvant AI, doing well.\par \par -continue exemestane daily, doing well.\par - CABRERA x 1 year\par -continue to follow up with her breast surgeon Dr. Cabello  \par -next mammo/US scheduled 3/2022 - scheduled\par -obtain copies of bw from PCP for review \par - bone density - Normal 7/2021;  f/up 7/2023\par - colonoscopy due* consult scheduled\par - f/up 3 months \par \par Patient had an opportunity to have her questions asked and answered to her satisfaction.\par \par \par

## 2022-02-22 NOTE — PHYSICAL EXAM
[Ambulatory and capable of all self care but unable to carry out any work activities] : Status 2- Ambulatory and capable of all self care but unable to carry out any work activities. Up and about more than 50% of waking hours [Obese] : obese [Normal] : normal appearance, no rash, nodules, vesicles, ulcers, erythema [de-identified] : s/p left lumpectomy with surgical scars well healed;  superior to scar fullness (seroma?);  nodularity over surgical scar; Patient denies any breast masses, breast tenderness, skin changes or nipple discharge. [de-identified] : obese; difficult to examine for HSM  [de-identified] : deferred

## 2022-02-23 ENCOUNTER — APPOINTMENT (OUTPATIENT)
Dept: GASTROENTEROLOGY | Facility: CLINIC | Age: 62
End: 2022-02-23
Payer: MEDICARE

## 2022-02-23 VITALS
BODY MASS INDEX: 40.98 KG/M2 | SYSTOLIC BLOOD PRESSURE: 139 MMHG | DIASTOLIC BLOOD PRESSURE: 84 MMHG | WEIGHT: 246 LBS | HEART RATE: 97 BPM | HEIGHT: 65 IN

## 2022-02-23 DIAGNOSIS — R06.00 DYSPNEA, UNSPECIFIED: ICD-10-CM

## 2022-02-23 DIAGNOSIS — M25.472 EFFUSION, RIGHT ANKLE: ICD-10-CM

## 2022-02-23 DIAGNOSIS — Z12.11 ENCOUNTER FOR SCREENING FOR MALIGNANT NEOPLASM OF COLON: ICD-10-CM

## 2022-02-23 DIAGNOSIS — R12 HEARTBURN: ICD-10-CM

## 2022-02-23 DIAGNOSIS — Z12.12 ENCOUNTER FOR SCREENING FOR MALIGNANT NEOPLASM OF COLON: ICD-10-CM

## 2022-02-23 DIAGNOSIS — Z78.9 OTHER SPECIFIED HEALTH STATUS: ICD-10-CM

## 2022-02-23 DIAGNOSIS — M25.471 EFFUSION, RIGHT ANKLE: ICD-10-CM

## 2022-02-23 PROCEDURE — 99204 OFFICE O/P NEW MOD 45 MIN: CPT

## 2022-02-23 RX ORDER — CALCIUM CARBONATE 500 MG/1
TABLET, CHEWABLE ORAL
Refills: 0 | Status: ACTIVE | COMMUNITY

## 2022-02-23 RX ORDER — ACETAMINOPHEN 325 MG/1
TABLET, FILM COATED ORAL
Refills: 0 | Status: ACTIVE | COMMUNITY

## 2022-02-23 RX ORDER — NIFEDIPINE 20 MG/1
CAPSULE ORAL
Refills: 0 | Status: DISCONTINUED | COMMUNITY
End: 2022-02-23

## 2022-02-23 RX ORDER — NAPROXEN SODIUM 220 MG/1
220 CAPSULE, LIQUID FILLED ORAL
Refills: 0 | Status: ACTIVE | COMMUNITY

## 2022-02-23 NOTE — PHYSICAL EXAM
[General Appearance - Alert] : alert [General Appearance - In No Acute Distress] : in no acute distress [General Appearance - Well Nourished] : well nourished [General Appearance - Well Developed] : well developed [Sclera] : the sclera and conjunctiva were normal [Neck Appearance] : the appearance of the neck was normal [Neck Cervical Mass (___cm)] : no neck mass was observed [Jugular Venous Distention Increased] : there was no jugular-venous distention [Thyroid Diffuse Enlargement] : the thyroid was not enlarged [Thyroid Nodule] : there were no palpable thyroid nodules [Auscultation Breath Sounds / Voice Sounds] : lungs were clear to auscultation bilaterally [Heart Rate And Rhythm] : heart rate was normal and rhythm regular [Heart Sounds] : normal S1 and S2 [Heart Sounds Gallop] : no gallops [Murmurs] : no murmurs [Heart Sounds Pericardial Friction Rub] : no pericardial rub [Full Pulse] : the pedal pulses are present [Bowel Sounds] : normal bowel sounds [Abdomen Soft] : soft [Abdomen Tenderness] : non-tender [Abdomen Mass (___ Cm)] : no abdominal mass palpated [Abdomen Hernia] : no hernia was discovered [FreeTextEntry1] : epigastric scar (prior feeding tube) [Patient Refused] : rectal exam was refused by the patient [Cervical Lymph Nodes Enlarged Posterior Bilaterally] : posterior cervical [Cervical Lymph Nodes Enlarged Anterior Bilaterally] : anterior cervical [Supraclavicular Lymph Nodes Enlarged Bilaterally] : supraclavicular [Inguinal Lymph Nodes Enlarged Bilaterally] : inguinal [Nail Clubbing] : no clubbing  or cyanosis of the fingernails [Skin Color & Pigmentation] : normal skin color and pigmentation [Skin Turgor] : normal skin turgor [] : no rash [Impaired Insight] : insight and judgment were intact [Oriented To Time, Place, And Person] : oriented to person, place, and time [Affect] : the affect was normal

## 2022-02-23 NOTE — CONSULT LETTER
[Dear  ___] : Dear  [unfilled], [Please see my note below.] : Please see my note below. [Courtesy Letter:] : I had the pleasure of seeing your patient, [unfilled], in my office today. [Consult Closing:] : Thank you very much for allowing me to participate in the care of this patient.  If you have any questions, please do not hesitate to contact me. [Sincerely,] : Sincerely, [FreeTextEntry3] : Dar Yip M.D.\par

## 2022-02-23 NOTE — ASSESSMENT
[FreeTextEntry1] : 1.  Rule out colorectal neoplasm.\par 2.  Occasional heartburn, without alarm symptoms.\par 3.  Morbid obesity.\par 4.  Status post left lumpectomy for DCIS 2021.\par 5.  Status post brain stem tumor surgery 1991 with temporary feeding tube insertion; residual left hemiparesis.\par 6.  Hypertension.\par 7.  Osteoarthritis.\par 8.  Snores; exertional dyspnea with ankle edema--suspect sleep apnea, possible restrictive and/or obstructive lung disease and pulmonary hypertension. Also at increased risk for heart disease.\par 9.  Allergic to penicillin, sulfa.\par \par Plan:\par 1.  Extensive medical records reviewed.\par 2.  Forward recent labs for my review.\par 3.  Pulmonary evaluation--clearance will be needed for elective anesthesia.\par 4.  Agree with need for colorectal cancer screening--she is not a candidate for office anesthesia.  Once cleared by pulmonary, can be set up for procedure at the hospital with Dr. Baudilio Weiss (who would perform there on my behalf, as I do no work at the [facility]). Procedure, rationale, alternatives (including Cologuard), material risks, anesthesia plan, and MiraLAX prep instructions were reviewed and brochure given.\par

## 2022-02-23 NOTE — REVIEW OF SYSTEMS
[Shortness Of Breath] : shortness of breath [Wheezing] : wheezing [SOB on Exertion] : shortness of breath during exertion [Negative] : Heme/Lymph [As Noted in HPI] : as noted in HPI [Heartburn] : heartburn [FreeTextEntry9] : Joint pain, left knee pain

## 2022-02-23 NOTE — HISTORY OF PRESENT ILLNESS
[FreeTextEntry1] : Keila is referred for consideration of screening colonoscopy.  Other than occasional heartburn, for which she would take Tums or Pepto-Bismol, with relief, she denies GI symptoms.  She had undergone brainstem tumor surgery 1991 with temporary feeding tube insertion (residual left hemiparesis).  She underwent left lumpectomy for DCIS.  She reports heavy snoring, as well as dyspnea on minimal exertion.

## 2022-03-25 ENCOUNTER — APPOINTMENT (OUTPATIENT)
Dept: ULTRASOUND IMAGING | Facility: IMAGING CENTER | Age: 62
End: 2022-03-25

## 2022-03-25 ENCOUNTER — RESULT REVIEW (OUTPATIENT)
Age: 62
End: 2022-03-25

## 2022-03-25 ENCOUNTER — OUTPATIENT (OUTPATIENT)
Dept: OUTPATIENT SERVICES | Facility: HOSPITAL | Age: 62
LOS: 1 days | End: 2022-03-25
Payer: COMMERCIAL

## 2022-03-25 ENCOUNTER — APPOINTMENT (OUTPATIENT)
Dept: MAMMOGRAPHY | Facility: IMAGING CENTER | Age: 62
End: 2022-03-25
Payer: MEDICARE

## 2022-03-25 DIAGNOSIS — Z98.2 PRESENCE OF CEREBROSPINAL FLUID DRAINAGE DEVICE: Chronic | ICD-10-CM

## 2022-03-25 DIAGNOSIS — G93.0 CEREBRAL CYSTS: Chronic | ICD-10-CM

## 2022-03-25 DIAGNOSIS — Z00.8 ENCOUNTER FOR OTHER GENERAL EXAMINATION: ICD-10-CM

## 2022-03-25 PROCEDURE — G0279: CPT

## 2022-03-25 PROCEDURE — 77066 DX MAMMO INCL CAD BI: CPT | Mod: 26

## 2022-03-25 PROCEDURE — G0279: CPT | Mod: 26

## 2022-03-25 PROCEDURE — 76641 ULTRASOUND BREAST COMPLETE: CPT | Mod: 26,50

## 2022-03-25 PROCEDURE — 76641 ULTRASOUND BREAST COMPLETE: CPT

## 2022-03-25 PROCEDURE — 77066 DX MAMMO INCL CAD BI: CPT

## 2022-04-24 ENCOUNTER — EMERGENCY (EMERGENCY)
Facility: HOSPITAL | Age: 62
LOS: 0 days | Discharge: HOME | End: 2022-04-25
Attending: STUDENT IN AN ORGANIZED HEALTH CARE EDUCATION/TRAINING PROGRAM
Payer: MEDICARE

## 2022-04-24 VITALS
TEMPERATURE: 99 F | DIASTOLIC BLOOD PRESSURE: 90 MMHG | HEIGHT: 64 IN | RESPIRATION RATE: 19 BRPM | SYSTOLIC BLOOD PRESSURE: 181 MMHG | WEIGHT: 240.08 LBS | HEART RATE: 92 BPM | OXYGEN SATURATION: 96 %

## 2022-04-24 DIAGNOSIS — L03.115 CELLULITIS OF RIGHT LOWER LIMB: ICD-10-CM

## 2022-04-24 DIAGNOSIS — Z98.2 PRESENCE OF CEREBROSPINAL FLUID DRAINAGE DEVICE: Chronic | ICD-10-CM

## 2022-04-24 DIAGNOSIS — Z88.2 ALLERGY STATUS TO SULFONAMIDES: ICD-10-CM

## 2022-04-24 DIAGNOSIS — M25.571 PAIN IN RIGHT ANKLE AND JOINTS OF RIGHT FOOT: ICD-10-CM

## 2022-04-24 DIAGNOSIS — I10 ESSENTIAL (PRIMARY) HYPERTENSION: ICD-10-CM

## 2022-04-24 DIAGNOSIS — G93.0 CEREBRAL CYSTS: Chronic | ICD-10-CM

## 2022-04-24 DIAGNOSIS — Z98.890 OTHER SPECIFIED POSTPROCEDURAL STATES: Chronic | ICD-10-CM

## 2022-04-24 DIAGNOSIS — Z88.0 ALLERGY STATUS TO PENICILLIN: ICD-10-CM

## 2022-04-24 DIAGNOSIS — Z20.822 CONTACT WITH AND (SUSPECTED) EXPOSURE TO COVID-19: ICD-10-CM

## 2022-04-24 LAB
ALBUMIN SERPL ELPH-MCNC: 3.9 G/DL — SIGNIFICANT CHANGE UP (ref 3.3–5)
ALP SERPL-CCNC: 95 U/L — SIGNIFICANT CHANGE UP (ref 40–120)
ALT FLD-CCNC: 24 U/L — SIGNIFICANT CHANGE UP (ref 12–78)
ANION GAP SERPL CALC-SCNC: 5 MMOL/L — SIGNIFICANT CHANGE UP (ref 5–17)
APTT BLD: 32.5 SEC — SIGNIFICANT CHANGE UP (ref 27.5–35.5)
AST SERPL-CCNC: 17 U/L — SIGNIFICANT CHANGE UP (ref 15–37)
BASOPHILS # BLD AUTO: 0.11 K/UL — SIGNIFICANT CHANGE UP (ref 0–0.2)
BASOPHILS NFR BLD AUTO: 0.9 % — SIGNIFICANT CHANGE UP (ref 0–2)
BILIRUB SERPL-MCNC: 0.4 MG/DL — SIGNIFICANT CHANGE UP (ref 0.2–1.2)
BUN SERPL-MCNC: 15 MG/DL — SIGNIFICANT CHANGE UP (ref 7–23)
CALCIUM SERPL-MCNC: 9.6 MG/DL — SIGNIFICANT CHANGE UP (ref 8.5–10.1)
CHLORIDE SERPL-SCNC: 105 MMOL/L — SIGNIFICANT CHANGE UP (ref 96–108)
CO2 SERPL-SCNC: 30 MMOL/L — SIGNIFICANT CHANGE UP (ref 22–31)
CREAT SERPL-MCNC: 0.96 MG/DL — SIGNIFICANT CHANGE UP (ref 0.5–1.3)
D DIMER BLD IA.RAPID-MCNC: 240 NG/ML DDU — HIGH
EGFR: 67 ML/MIN/1.73M2 — SIGNIFICANT CHANGE UP
EOSINOPHIL # BLD AUTO: 0.19 K/UL — SIGNIFICANT CHANGE UP (ref 0–0.5)
EOSINOPHIL NFR BLD AUTO: 1.5 % — SIGNIFICANT CHANGE UP (ref 0–6)
FLUAV AG NPH QL: SIGNIFICANT CHANGE UP
FLUBV AG NPH QL: SIGNIFICANT CHANGE UP
GLUCOSE SERPL-MCNC: 99 MG/DL — SIGNIFICANT CHANGE UP (ref 70–99)
HCT VFR BLD CALC: 41.1 % — SIGNIFICANT CHANGE UP (ref 34.5–45)
HGB BLD-MCNC: 12.6 G/DL — SIGNIFICANT CHANGE UP (ref 11.5–15.5)
IMM GRANULOCYTES NFR BLD AUTO: 0.5 % — SIGNIFICANT CHANGE UP (ref 0–1.5)
INR BLD: 1.01 RATIO — SIGNIFICANT CHANGE UP (ref 0.88–1.16)
LYMPHOCYTES # BLD AUTO: 2.45 K/UL — SIGNIFICANT CHANGE UP (ref 1–3.3)
LYMPHOCYTES # BLD AUTO: 20 % — SIGNIFICANT CHANGE UP (ref 13–44)
MCHC RBC-ENTMCNC: 29.3 PG — SIGNIFICANT CHANGE UP (ref 27–34)
MCHC RBC-ENTMCNC: 30.7 G/DL — LOW (ref 32–36)
MCV RBC AUTO: 95.6 FL — SIGNIFICANT CHANGE UP (ref 80–100)
MONOCYTES # BLD AUTO: 0.96 K/UL — HIGH (ref 0–0.9)
MONOCYTES NFR BLD AUTO: 7.8 % — SIGNIFICANT CHANGE UP (ref 2–14)
NEUTROPHILS # BLD AUTO: 8.5 K/UL — HIGH (ref 1.8–7.4)
NEUTROPHILS NFR BLD AUTO: 69.3 % — SIGNIFICANT CHANGE UP (ref 43–77)
NRBC # BLD: 0 /100 WBCS — SIGNIFICANT CHANGE UP (ref 0–0)
NT-PROBNP SERPL-SCNC: 72 PG/ML — SIGNIFICANT CHANGE UP (ref 0–125)
PLATELET # BLD AUTO: 317 K/UL — SIGNIFICANT CHANGE UP (ref 150–400)
POTASSIUM SERPL-MCNC: 4.3 MMOL/L — SIGNIFICANT CHANGE UP (ref 3.5–5.3)
POTASSIUM SERPL-SCNC: 4.3 MMOL/L — SIGNIFICANT CHANGE UP (ref 3.5–5.3)
PROT SERPL-MCNC: 8.1 GM/DL — SIGNIFICANT CHANGE UP (ref 6–8.3)
PROTHROM AB SERPL-ACNC: 12 SEC — SIGNIFICANT CHANGE UP (ref 10.5–13.4)
RBC # BLD: 4.3 M/UL — SIGNIFICANT CHANGE UP (ref 3.8–5.2)
RBC # FLD: 14.6 % — HIGH (ref 10.3–14.5)
SARS-COV-2 RNA SPEC QL NAA+PROBE: SIGNIFICANT CHANGE UP
SODIUM SERPL-SCNC: 140 MMOL/L — SIGNIFICANT CHANGE UP (ref 135–145)
TROPONIN I, HIGH SENSITIVITY RESULT: 9 NG/L — SIGNIFICANT CHANGE UP
WBC # BLD: 12.27 K/UL — HIGH (ref 3.8–10.5)
WBC # FLD AUTO: 12.27 K/UL — HIGH (ref 3.8–10.5)

## 2022-04-24 PROCEDURE — 71275 CT ANGIOGRAPHY CHEST: CPT | Mod: 26,MA

## 2022-04-24 PROCEDURE — 93971 EXTREMITY STUDY: CPT | Mod: 26,RT

## 2022-04-24 PROCEDURE — 99285 EMERGENCY DEPT VISIT HI MDM: CPT

## 2022-04-24 PROCEDURE — 93010 ELECTROCARDIOGRAM REPORT: CPT

## 2022-04-24 NOTE — ED ADULT TRIAGE NOTE - CHIEF COMPLAINT QUOTE
Right ankle swelling since Friday with pain  HX brain sx, left breast lumpectomy- unsure if we can use arm, HTN, obesity  After ambulating to triage found low SOB with POX 88% which increased to 96% with breathing exercises- denies O2 use

## 2022-04-24 NOTE — ED PROVIDER NOTE - SKIN, MLM
Skin normal color for race, warm, dry and intact. Hyperpigmentation to R distal leg, no blisters or lesions.

## 2022-04-24 NOTE — ED PROVIDER NOTE - NS ED ROS FT
Constitutional: (-) Fever, (-) Chills  Skin: (-) Rashes  Eyes: (-) Visual changes, (-) Discharge, (-) Redness  Ears: (-) Hearing loss, (-)Tinnitus, (-) Ear pain  Nose: (-) Nasal congestion, (-) Runny nose  Mouth/Throat: (-) Sore throat  CV: (-) Chest pain  Resp: (-) Cough, (-) Shortness of breath, (-) Dyspnea on Exertion, (-) Wheezing  GI: (-) Abdominal pain, (-) Nausea, (-) Vomiting, (-) Diarrhea  : (-) Dysuria   MSK: (+) Myalgias, (+)Calf tenderness  Neuro: (-) Headache

## 2022-04-24 NOTE — ED PROVIDER NOTE - CLINICAL SUMMARY MEDICAL DECISION MAKING FREE TEXT BOX
62y Female with PMHx of HTN, right breast lumpectomy no chemo or radiation, brain sx in 1990s for cysts (walks with walker and has slurred speech at baseline since surgery) presents to the ER for swelling of ankles. Reports R ankle discoloration x 6 weeks with acute ankle pain/calf pain and swelling the last 2 days. Went to urgent care, O2 low (90-92), advised to come to ER for evaluation. Denies fever, chills, cough, chest pain, SOB, abdominal pain, n/v/d. Vital signs stable, exam as noted above. Will get labs, EKG, US, CT, reassess.

## 2022-04-24 NOTE — ED ADULT NURSE NOTE - OBJECTIVE STATEMENT
as per patient c/o R ankle swelling and worsening discoloration since friday. Patient sent from urgent care due to low pulse ox of 88% on RA. Patient endorsing some SOB. Denies fevers, chills and cough. Hx: HTN.

## 2022-04-24 NOTE — ED PROVIDER NOTE - PATIENT PORTAL LINK FT
You can access the FollowMyHealth Patient Portal offered by [facility] by registering at the following website: [email or web address]. By joining [employer]’s FollowMyHealth portal, you will also be able to view your health information using other applications (apps) compatible with our system.

## 2022-04-24 NOTE — ED PROVIDER NOTE - OBJECTIVE STATEMENT
62y Female with PMHx of HTN, right breast lumpectomy no chemo or radiation, brain sx in 1990s for cysts (walks with walker and has slurred speech at baseline since surgery) presents to the ER for swelling of ankles. Patient reports R ankle discoloration x 6 weeks with acute ankle pain/calf pain and swelling the last 2 days. Went to urgent care, O2 low (90-92), advised to come to ER for evaluation. Denies fever, chills, cough, chest pain, SOB, abdominal pain, n/v/d. Denies oxygen use at home.

## 2022-04-24 NOTE — ED PROVIDER NOTE - PROGRESS NOTE DETAILS
SIXTO SOSA: CTA negative, US negative for DVT. likely cellulitis? Tx keflex and dc home. I have discussed with the patient about the ED workup, lab results, diagnostics results, plan for discharge home, need for follow-up with primary care physician/specialists, and return precautions. At this time, the patient does not require further workup in the ED. The patient is subjectively feeling better and would like to be discharged home. The patient had the opportunity to ask questions and I have answered all inquiries. The patient verbalizes understanding and agreement with the plan. The patient is hemodynamically stable, clinically well-appearing, ambulatory, mentating well and ready for discharge home.

## 2022-04-24 NOTE — ED ADULT NURSE NOTE - NSIMPLEMENTINTERV_GEN_ALL_ED
Implemented All Fall Risk Interventions:  [place] to call system. Call bell, personal items and telephone within reach. Instruct patient to call for assistance. Room bathroom lighting operational. Non-slip footwear when patient is off stretcher. Physically safe environment: no spills, clutter or unnecessary equipment. Stretcher in lowest position, wheels locked, appropriate side rails in place. Provide visual cue, wrist band, yellow gown, etc. Monitor gait and stability. Monitor for mental status changes and reorient to person, place, and time. Review medications for side effects contributing to fall risk. Reinforce activity limits and safety measures with patient and family.

## 2022-04-25 VITALS
RESPIRATION RATE: 18 BRPM | DIASTOLIC BLOOD PRESSURE: 112 MMHG | SYSTOLIC BLOOD PRESSURE: 177 MMHG | OXYGEN SATURATION: 95 % | HEART RATE: 77 BPM | TEMPERATURE: 98 F

## 2022-04-25 RX ADMIN — Medication 450 MILLIGRAM(S): at 00:06

## 2022-04-27 RX ORDER — NIFEDIPINE 30 MG
0 TABLET, EXTENDED RELEASE 24 HR ORAL
Qty: 0 | Refills: 0 | DISCHARGE

## 2022-04-27 RX ORDER — LISINOPRIL 2.5 MG/1
0 TABLET ORAL
Qty: 0 | Refills: 0 | DISCHARGE

## 2022-04-27 RX ORDER — HYDROCHLOROTHIAZIDE 25 MG
0 TABLET ORAL
Qty: 0 | Refills: 0 | DISCHARGE

## 2022-05-24 ENCOUNTER — OUTPATIENT (OUTPATIENT)
Dept: OUTPATIENT SERVICES | Facility: HOSPITAL | Age: 62
LOS: 1 days | Discharge: HOME | End: 2022-05-24

## 2022-05-24 DIAGNOSIS — G93.0 CEREBRAL CYSTS: Chronic | ICD-10-CM

## 2022-05-24 DIAGNOSIS — Z98.890 OTHER SPECIFIED POSTPROCEDURAL STATES: Chronic | ICD-10-CM

## 2022-05-24 DIAGNOSIS — Z85.3 PERSONAL HISTORY OF MALIGNANT NEOPLASM OF BREAST: ICD-10-CM

## 2022-05-24 DIAGNOSIS — Z98.2 PRESENCE OF CEREBROSPINAL FLUID DRAINAGE DEVICE: Chronic | ICD-10-CM

## 2022-05-24 PROBLEM — I10 ESSENTIAL (PRIMARY) HYPERTENSION: Chronic | Status: ACTIVE | Noted: 2022-04-24

## 2022-05-31 ENCOUNTER — APPOINTMENT (OUTPATIENT)
Dept: HEMATOLOGY ONCOLOGY | Facility: CLINIC | Age: 62
End: 2022-05-31

## 2022-06-09 DIAGNOSIS — Z01.818 ENCOUNTER FOR OTHER PREPROCEDURAL EXAMINATION: ICD-10-CM

## 2022-06-13 ENCOUNTER — APPOINTMENT (OUTPATIENT)
Dept: GASTROENTEROLOGY | Facility: HOSPITAL | Age: 62
End: 2022-06-13

## 2022-06-14 ENCOUNTER — NON-APPOINTMENT (OUTPATIENT)
Age: 62
End: 2022-06-14

## 2022-06-20 ENCOUNTER — APPOINTMENT (OUTPATIENT)
Dept: ORTHOPEDIC SURGERY | Facility: CLINIC | Age: 62
End: 2022-06-20
Payer: MEDICARE

## 2022-06-20 VITALS
DIASTOLIC BLOOD PRESSURE: 87 MMHG | HEART RATE: 103 BPM | SYSTOLIC BLOOD PRESSURE: 164 MMHG | WEIGHT: 246 LBS | HEIGHT: 65 IN | OXYGEN SATURATION: 93 % | BODY MASS INDEX: 40.98 KG/M2

## 2022-06-20 PROCEDURE — 99204 OFFICE O/P NEW MOD 45 MIN: CPT | Mod: 25

## 2022-06-20 PROCEDURE — 73564 X-RAY EXAM KNEE 4 OR MORE: CPT | Mod: LT,RT

## 2022-06-20 PROCEDURE — 20611 DRAIN/INJ JOINT/BURSA W/US: CPT | Mod: RT,LT

## 2022-06-20 RX ORDER — HYALURONATE SODIUM, STABILIZED 88 MG/4 ML
88 SYRINGE (ML) INTRAARTICULAR
Qty: 2 | Refills: 0 | Status: ACTIVE | COMMUNITY
Start: 2022-06-20

## 2022-06-23 ENCOUNTER — APPOINTMENT (OUTPATIENT)
Dept: HEMATOLOGY ONCOLOGY | Facility: CLINIC | Age: 62
End: 2022-06-23

## 2022-06-23 PROCEDURE — 99442: CPT

## 2022-06-23 NOTE — ASSESSMENT
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 Cabello  \par -mammo/US up to date\par -bw reviewed from PCP\par -f/up 2 months \par \par

## 2022-06-23 NOTE — HISTORY OF PRESENT ILLNESS
[Home] : at home, [unfilled] , at the time of the visit. [Medical Office: ([facility])___] : at the medical office located in  [Verbal consent obtained from patient] : the patient, [unfilled] [de-identified] : The patient had a routine mammogram and sonogram on 7/10/20 with additional imaging left breast on 9/4/20 that showed suspicious calcifications in the upper inner quadrant. L 10:00 biopsy DCIS %/MA 80%+.\par \par Pathology report 10/7/20:\par Breast, core biopsies-\par A. Ductal carcinoma in situ, high grade, cribriform and micropapillary types, associated with calcifications.\par Fibrocystic changes associated with focal calcifications.\par B. Fibrocystic changes associated with calcium oxalate. \par \par She is status post left breast lumpectomy with Dr. Clarence Cabello on 1/19/21 for DCIS.  \par Final pathology revealed no residual DCIS with biopsy site changes.\par \par The patient met with radiation oncologist, low DCISion RT, therefore patient chose to defer radiation therapy.  \par \par Anastrozole started 4/2021 and then switched to exemestane b/c of dizziness and arthralgias. \par \par Family history of breast cancer- 2nd cousin. 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 [de-identified] : ER+ (100%)/MI+ (80%) [de-identified] : \par Patient presents today to rule out metastatic breast cancer and assess treatment toxicity - exemestane\par Started endocrine therapy 4/2021, switched to exemestane since 8/12/21.  She states she is tolerating well with no complaints.  \par Left knee osteoarthritis slightly worse, was recommended to have knee replacement - will not be doing; s/p cortisone injection\par last saw Dr. Cabello 8/6/21, area of concern likely scarring secondary to lumpectomy; reminded pt due to see in 8/2022\par mammo/US 3/25/22 BIRADS2\par Labs done w/ Pcp in January of 2022 - reviewed \par \par HEALTH MAINTENANCE \par PCP Dr. Ramires, following for HTN\par ORTHO OA - gets knee injection every 6 months, not taking any oral medications for this currently\par Gyn: Dr. Sears - sees annually, no vaginal bleeding or spotting\par BMD 7/16/21 normal\par GI Dr. 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 LMP: "years ago"\par s/p COVID vaccine and booster

## 2022-09-20 ENCOUNTER — APPOINTMENT (OUTPATIENT)
Dept: ORTHOPEDIC SURGERY | Facility: CLINIC | Age: 62
End: 2022-09-20

## 2022-09-20 PROCEDURE — 99214 OFFICE O/P EST MOD 30 MIN: CPT | Mod: 25

## 2022-09-20 PROCEDURE — 20611 DRAIN/INJ JOINT/BURSA W/US: CPT | Mod: 50

## 2022-09-22 NOTE — CONSULT LETTER
[Dear  ___] : Dear  [unfilled], [Courtesy Letter:] : I had the pleasure of seeing your patient, [unfilled], in my office today. [Please see my note below.] : Please see my note below. [Sincerely,] : Sincerely, [FreeTextEntry3] : Clarence Cabello MD FACS Upper/Lower

## 2022-09-23 NOTE — PROCEDURE
[de-identified] : Ultrasound Guided Injection \par Indication for ultrasound guidance: Ensure placement within the knee joint\par \par Utilizing the SETVIe portable ultrasound machine, the Linear 6cm 13-6 MHz transducer and sterile ultrasound gel, ultrasound guidance with the probe in the short axis, utilizing an in plane approach, was used for the following injection:\par \par \par Aspiration: Right knee joint.\par Indication: Effusion and arthritis\par \par A discussion was had with the patient regarding this procedure and all questions were answered. All risks, benefits and alternatives were discussed. These included but were not limited to bleeding, infection, allergic reaction and reaccumulation of fluid. A timeout was done to ensure correct side and pt agreed to the procedure.  Chlorhexidine was used to sterilize and prep the area in the supero-lateral aspect of the knee. .A 25-gauge needle was used to injection 3mL of 1% lidocaine without epinephrine.  An 18-gauge needle was then used to aspirate the knee joint and approximately 30 cc of yellow fluid was aspirated from the knee without complication, the same needle was used to inject 4cc of 1% lidocaine without epinephrine and 1cc of 40mg/ml methylprednisolone into the knee. A sterile bandage was then applied. The patient tolerated the procedure well. \par \par Aspiration: Left knee joint.\par Indication: Effusion and arthritis\par \par A discussion was had with the patient regarding this procedure and all questions were answered. All risks, benefits and alternatives were discussed. These included but were not limited to bleeding, infection, allergic reaction and reaccumulation of fluid. A timeout was done to ensure correct side and pt agreed to the procedure.  Chlorhexidine was used to sterilize and prep the area in the supero-lateral aspect of the knee. .A 25-gauge needle was used to injection 3mL of 1% lidocaine without epinephrine.  An 18-gauge needle was then used to aspirate the knee joint and approximately 30 cc of yellow fluid was aspirated from the knee without complication, the same needle was used to inject 4cc of 1% lidocaine without epinephrine and 1cc of 40mg/ml methylprednisolone into the knee. A sterile bandage was then applied. The patient tolerated the procedure well.

## 2022-09-23 NOTE — HISTORY OF PRESENT ILLNESS
[de-identified] : YASIR MARIE  is a 62 year old F who presents for initial visit with bilateral knee pain.  Pain is primarily located at the posterior knees. It began several years ago, without injury or trauma.  Pain is worse with flexion and weightbearing. She has had both hyaluronic acid and cortisone injections in the past with relief. Most recent injections were over 2 years ago. Patient notes she recently had cellulitis on the right knee, and has completed her course of antibiotics. No prior injury. Denies numbness, tingling, weakness of the lower extremities.

## 2022-09-23 NOTE — ADDENDUM
[FreeTextEntry1] : I, Skylar Ralph, assisted with documentation on 09/20/2022  acting as scribe for Dr. Amy Dong.\par \par I, Amy Dong MD, EdM, have read and attest that all the information, medical decision making and discharge instructions within are true and accurate.

## 2022-09-23 NOTE — DISCUSSION/SUMMARY
[de-identified] : Keila presents for follow up of bilateral knee osteoarthritis. Bilateral ultrasound guided aspiration and steroid injections provided today for acute pain. Patient tolerated the procedure well. I informed the patient that the numbing medicine in today's injection will last for about 4-6 hours. The steroid that was injected will start to work in 1 to 2 days, peak at 1-2 weeks, and may last up to 4-6 weeks.  Pain may worsen over the next 72 hours. She may return for Monovisc injection as needed.\par \par Amy Dong MD, EdM\par Sports Medicine [facility]\par Department of Orthopedics

## 2022-09-23 NOTE — PHYSICAL EXAM
[de-identified] : Constitutional: Well-nourished, well-developed, No acute distress\par Respiratory:  Good respiratory effort, no SOB\par Lymphatic: No regional lymphadenopathy, no lymphedema\par Psychiatric: Pleasant and normal affect, alert and oriented x3\par Skin: Clean dry and intact B/L UE/LE\par Musculoskeletal: normal except where as noted in regional exam\par \par Bilateral Knees:\par APPEARANCE: no marked deformities, no swelling or malalignment\par POSITIVE TENDERNESS:  medial joint line. \par ROM: full & pain with flexion\par SPECIAL TESTS: stable v/v stress. painless grind. neg Lachman's. neg ant/post drawer. pos Shahram's. \par NEURO: Normal sensation of LE, DTRs 2+/4 patella and achilles\par PULSES: 2+ DP/PT pulses\par B/L Hips: No asymmetry, malalignment, or swelling, Full ROM, 5/5 strength in flexion/ext, IR/ER, Abd/Add, Joints stable\par B/L Ankles: No asymmetry, malalignment, or swelling, Full ROM, 5/5 strength in DF/PF/Inv/Ev, Joints stable

## 2022-10-14 NOTE — H&P PST ADULT - VENOUS THROMBOEMBOLISM CURRENT STATUS
No
(1) other risk factor (includes escalating BMI, pack-years of smoking, diabetes requiring insulin, chemotherapy, female gender and length of surgery)/(2) malignancy (present or previous)

## 2022-10-19 ENCOUNTER — APPOINTMENT (OUTPATIENT)
Dept: CARE COORDINATION | Facility: HOME HEALTH | Age: 62
End: 2022-10-19

## 2022-10-19 DIAGNOSIS — R06.83 SNORING: ICD-10-CM

## 2022-10-19 DIAGNOSIS — Z00.00 ENCOUNTER FOR GENERAL ADULT MEDICAL EXAMINATION W/OUT ABNORMAL FINDINGS: ICD-10-CM

## 2022-10-19 DIAGNOSIS — E78.5 HYPERLIPIDEMIA, UNSPECIFIED: ICD-10-CM

## 2022-10-19 DIAGNOSIS — G81.94 HEMIPLEGIA, UNSPECIFIED AFFECTING LEFT NONDOMINANT SIDE: ICD-10-CM

## 2022-10-19 DIAGNOSIS — I10 ESSENTIAL (PRIMARY) HYPERTENSION: ICD-10-CM

## 2022-10-19 DIAGNOSIS — E66.01 MORBID (SEVERE) OBESITY DUE TO EXCESS CALORIES: ICD-10-CM

## 2022-10-19 DIAGNOSIS — Z87.898 PERSONAL HISTORY OF OTHER SPECIFIED CONDITIONS: ICD-10-CM

## 2022-10-19 PROCEDURE — 99349 HOME/RES VST EST MOD MDM 40: CPT | Mod: 95

## 2022-10-19 NOTE — COUNSELING
[Fall prevention counseling provided] : Fall prevention counseling provided [Potential consequences of obesity discussed] : Potential consequences of obesity discussed [Benefits of weight loss discussed] : Benefits of weight loss discussed [Encouraged to increase physical activity] : Encouraged to increase physical activity

## 2022-10-19 NOTE — ASU PATIENT PROFILE, ADULT - PMH
Speech Treatment Note    Today's date: 10/19/2022  Patient name: Wilmer Mosley  : 2020  MRN: [id number]  Referring provider: Ayesha Polanco MD  Dx:   Encounter Diagnosis     ICD-10-CM    1  Expressive language disorder  F80 1        Start Time: 0900  Stop Time: 945  Total time in clinic (min): 45 minutes    Visit Number:6    Subjective/Behavioral: Pt arrived on time to session with father  Pt transitioned back to room with ST and parent, parent was unable to transition out of therapy room this date due to pt behaviors  Pt was observed to initially elope to parent during first 5 minutes of session, ST transffered items to table top where pt participated, pt was then participated with ST in play on floor  1  Pt will produce environmental sounds during play(e g  moo, vroom, beep) in 4/5 opp  Pt indep produced "vroom" and "uh oh" this date  2  Pt will produce 4 new core words by end of POC to request, reject, or comment in /5 opp  -Given indirect and direct models pt produced "up", "more please", "want bubbles", and "help" this date  He indep stated bye bye upon parting therapy room  ST modeled all done across all activities however pt observed to throw items or push away when done, parent stated this is new at home as well this week  3  Pt will ID/Label age meenu vocab (e g  shapes, animals, common items, body parts) in /5 opp    -Pt imitated labels for "red", "pink", "crash", and "push" while engaged in play  He indep produced "eyes", "hat", "blue" and "bubbles"  Long Term Goals: Pt will improve expressive language skills to an age appropriate level    Care giver goal: improve his expressive abilities    Intervention certification [facility]:  Intervention certification PM:      Other:Patient's family member was present was present during today's session   and Discussed session and patient progress with caregiver/family member after today's session    Recommendations:Continue with Plan of Care- Transition parent out of room; cont build pt rapport History of cyst of brain    Hypertension     History of cyst of brain    Hypertension    OA (osteoarthritis)    Sleep apnea

## 2022-10-22 NOTE — HEALTH RISK ASSESSMENT
[Never] : Never [0-4] : 0-4 [No] : No [Never (0 pts)] : Never (0 points) [No falls in past year] : Patient reported no falls in the past year [0] : 2) Feeling down, depressed, or hopeless: Not at all (0) [PHQ-2 Negative - No further assessment needed] : PHQ-2 Negative - No further assessment needed [Audit-CScore] : 0 [[id number]] : 0

## 2022-10-22 NOTE — ASSESSMENT
[FreeTextEntry1] : Health Maintenance: declined flu shot . Fully vaccinated for Covid + booster. Will f/u with GI re. alternate screening if she is unable to obtain pulmonary clearance. A1C 2/2022 = 5.5

## 2022-10-22 NOTE — PHYSICAL EXAM
[Alert and Oriented x3] : oriented to person, place, and time [de-identified] : Physical exam limited ^ telehealth encounter no acute distress, well nourished and well-appearing.

## 2022-10-22 NOTE — HISTORY OF PRESENT ILLNESS
[Home] : at home, [unfilled] , at the time of the visit. [Medical Office: ([facility])___] : at the medical office located in  [Verbal consent obtained from patient] : the patient, [unfilled] [FreeTextEntry1] : "[employer] Assessment" [de-identified] : 62 year old female PMH DCIS s/p left lumpectomy , brain hemangioma s/p resection in 91 with residual left him, HTN, Morbid obesity, OA knees seen for follow up [employer] Annual Assessment. Pt is A&O x 3, present with daughter. Lives with family who provides prn assistance with ADLs/IADLs ^ left romelia. Pt ambulates with walker outdoors, more recently limited due to pain in knee, \par \par Currently interested in pulmonary eval ^ obesity and snoring- had colonoscopy planned pending pulmonary clearance. Last PAP done in 2021 (reported normal)\par

## 2022-10-31 ENCOUNTER — APPOINTMENT (OUTPATIENT)
Dept: ORTHOPEDIC SURGERY | Facility: CLINIC | Age: 62
End: 2022-10-31

## 2022-11-11 ENCOUNTER — APPOINTMENT (OUTPATIENT)
Dept: ORTHOPEDIC SURGERY | Facility: CLINIC | Age: 62
End: 2022-11-11
Payer: MEDICARE

## 2022-11-11 DIAGNOSIS — M17.12 UNILATERAL PRIMARY OSTEOARTHRITIS, LEFT KNEE: ICD-10-CM

## 2022-11-11 PROCEDURE — 20611 DRAIN/INJ JOINT/BURSA W/US: CPT | Mod: RT

## 2022-11-11 PROCEDURE — 99024 POSTOP FOLLOW-UP VISIT: CPT

## 2022-11-13 NOTE — PROCEDURE
[de-identified] : \par \par \par Ultrasound Guided Injection \par Indication for ultrasound guidance: Ensure placement within the knee joint\par \par Utilizing the [employer] II  portable ultrasound machine, the Linear 6cm 13-6 MHz transducer and sterile ultrasound gel, ultrasound guidance with the probe in the short axis, utilizing an in plane approach, was used for the following injection:\par \par \par Aspiration: Right knee joint.\par Indication: Effusion and arthritis\par \par A discussion was had with the patient regarding this procedure and all questions were answered. All risks, benefits and alternatives were discussed. These included but were not limited to bleeding, infection, allergic reaction and reaccumulation of fluid. A timeout was done to ensure correct side and pt agreed to the procedure.  Chlorhexidine was used to sterilize and prep the area in the supero-lateral aspect of the knee. .A 25-gauge needle was used to injection 3mL of 1% lidocaine without epinephrine.  An 18-gauge needle was then used to aspirate the knee joint and approximately 60 cc of yellow fluid was aspirated from the knee without complication, the same needle was used to inject 4cc of Monovisc ([id number]) into the knee. A sterile bandage was then applied. The patient tolerated the procedure well. \par  \par Aspiration: Left knee joint.\par Indication: Effusion and arthritis\par \par A discussion was had with the patient regarding this procedure and all questions were answered. All risks, benefits and alternatives were discussed. These included but were not limited to bleeding, infection, allergic reaction and reaccumulation of fluid. A timeout was done to ensure correct side and pt agreed to the procedure.  Chlorhexidine was used to sterilize and prep the area in the supero-lateral aspect of the knee. .A 25-gauge needle was used to injection 3mL of 1% lidocaine without epinephrine.  An 18-gauge needle was then used to aspirate the knee joint and approximately 30 cc of yellow fluid was aspirated from the knee without complication, the same needle was used to inject 4cc of Monovisc ([id number]) into the knee. A sterile bandage was then applied. The patient tolerated the procedure well.

## 2022-11-13 NOTE — PHYSICAL EXAM
[de-identified] : Constitutional: Well-nourished, well-developed, No acute distress\par Respiratory: Good respiratory effort, no SOB\par Lymphatic: No regional lymphadenopathy, no lymphedema\par Psychiatric: Pleasant and normal affect, alert and oriented x3\par Skin: Clean dry and intact B/L UE/LE\par Musculoskeletal: normal except where as noted in regional exam\par \par Bilateral Knees:\par APPEARANCE: no marked deformities, no swelling or malalignment\par POSITIVE TENDERNESS: medial joint line. \par ROM: full & pain with flexion\par SPECIAL TESTS: stable v/v stress. painless grind. neg Lachman's. neg ant/post drawer. pos Shahram's. \par NEURO: Normal sensation of LE, DTRs 2+/4 patella and achilles\par PULSES: 2+ DP/PT pulses\par B/L Hips: No asymmetry, malalignment, or swelling, Full ROM, 5/5 strength in flexion/ext, IR/ER, Abd/Add, Joints stable\par B/L Ankles: No asymmetry, malalignment, or swelling, Full ROM, 5/5 strength in DF/PF/Inv/Ev, Joints stable \par

## 2022-11-13 NOTE — DISCUSSION/SUMMARY
[de-identified] : Princess presents for follow up of bilateral knee osteoarthritis. Bilateral ultrasound guidedaspirations and Monovisc injections performed today. Risks and benefits of hyaluronic acid injections were discussed. Patient understands it may take up to a week to notice effects. Patient tolerated the procedure well. Follow up 3 months. \par \par Amy Dong MD, EdM\par Sports Medicine [facility]\par Department of Orthopedics

## 2022-11-13 NOTE — ASSESSMENT
[FreeTextEntry1] : Keila presents for follow up of bilateral knee osteoarthritis. Bilateral knee Monovisc injection provided today for pain relief. Patient tolerated the procedure well.

## 2022-11-13 NOTE — HISTORY OF PRESENT ILLNESS
[de-identified] : YASIR MARIE is a 62 year old F who presents for follow up visit with bilateral knee pain. Pain is primarily located at the posterior knees. It began several years ago, without injury or trauma. Pain is worse with flexion and weightbearing. She is known to have osteoarthritis. Patient was last seen 9/20/22 and had bilateral steroid injections with good relief for about a month.She continues to take Motrin and Tylenol arthritis's for pain relief. She presents today for hyaluronic acid injections.\par Denies numbness, tingling, weakness of the lower extremities.

## 2022-12-10 ENCOUNTER — EMERGENCY (EMERGENCY)
Facility: HOSPITAL | Age: 62
LOS: 0 days | Discharge: HOME | End: 2022-12-10
Attending: STUDENT IN AN ORGANIZED HEALTH CARE EDUCATION/TRAINING PROGRAM

## 2022-12-10 VITALS
HEIGHT: 65 IN | SYSTOLIC BLOOD PRESSURE: 161 MMHG | HEART RATE: 78 BPM | WEIGHT: 229.94 LBS | TEMPERATURE: 99 F | RESPIRATION RATE: 16 BRPM | DIASTOLIC BLOOD PRESSURE: 111 MMHG | OXYGEN SATURATION: 98 %

## 2022-12-10 VITALS
TEMPERATURE: 99 F | SYSTOLIC BLOOD PRESSURE: 150 MMHG | HEART RATE: 76 BPM | RESPIRATION RATE: 17 BRPM | OXYGEN SATURATION: 99 % | DIASTOLIC BLOOD PRESSURE: 88 MMHG

## 2022-12-10 DIAGNOSIS — Z98.890 OTHER SPECIFIED POSTPROCEDURAL STATES: Chronic | ICD-10-CM

## 2022-12-10 DIAGNOSIS — G51.0 BELL'S PALSY: ICD-10-CM

## 2022-12-10 DIAGNOSIS — Z20.822 CONTACT WITH AND (SUSPECTED) EXPOSURE TO COVID-19: ICD-10-CM

## 2022-12-10 DIAGNOSIS — R29.810 FACIAL WEAKNESS: ICD-10-CM

## 2022-12-10 DIAGNOSIS — R47.81 SLURRED SPEECH: ICD-10-CM

## 2022-12-10 DIAGNOSIS — Z98.2 PRESENCE OF CEREBROSPINAL FLUID DRAINAGE DEVICE: Chronic | ICD-10-CM

## 2022-12-10 DIAGNOSIS — Z88.0 ALLERGY STATUS TO PENICILLIN: ICD-10-CM

## 2022-12-10 DIAGNOSIS — G93.0 CEREBRAL CYSTS: Chronic | ICD-10-CM

## 2022-12-10 DIAGNOSIS — Z88.2 ALLERGY STATUS TO SULFONAMIDES: ICD-10-CM

## 2022-12-10 DIAGNOSIS — I10 ESSENTIAL (PRIMARY) HYPERTENSION: ICD-10-CM

## 2022-12-10 LAB
ALBUMIN SERPL ELPH-MCNC: 4 G/DL — SIGNIFICANT CHANGE UP (ref 3.3–5)
ALP SERPL-CCNC: 90 U/L — SIGNIFICANT CHANGE UP (ref 40–120)
ALT FLD-CCNC: 22 U/L — SIGNIFICANT CHANGE UP (ref 12–78)
ANION GAP SERPL CALC-SCNC: 4 MMOL/L — LOW (ref 5–17)
AST SERPL-CCNC: 15 U/L — SIGNIFICANT CHANGE UP (ref 15–37)
BASOPHILS # BLD AUTO: 0.06 K/UL — SIGNIFICANT CHANGE UP (ref 0–0.2)
BASOPHILS NFR BLD AUTO: 0.6 % — SIGNIFICANT CHANGE UP (ref 0–2)
BILIRUB SERPL-MCNC: 0.5 MG/DL — SIGNIFICANT CHANGE UP (ref 0.2–1.2)
BUN SERPL-MCNC: 14 MG/DL — SIGNIFICANT CHANGE UP (ref 7–23)
CALCIUM SERPL-MCNC: 9.7 MG/DL — SIGNIFICANT CHANGE UP (ref 8.5–10.1)
CHLORIDE SERPL-SCNC: 109 MMOL/L — HIGH (ref 96–108)
CO2 SERPL-SCNC: 30 MMOL/L — SIGNIFICANT CHANGE UP (ref 22–31)
CREAT SERPL-MCNC: 1.06 MG/DL — SIGNIFICANT CHANGE UP (ref 0.5–1.3)
EGFR: 59 ML/MIN/1.73M2 — LOW
EOSINOPHIL # BLD AUTO: 0.14 K/UL — SIGNIFICANT CHANGE UP (ref 0–0.5)
EOSINOPHIL NFR BLD AUTO: 1.4 % — SIGNIFICANT CHANGE UP (ref 0–6)
FLUAV AG NPH QL: SIGNIFICANT CHANGE UP
FLUBV AG NPH QL: SIGNIFICANT CHANGE UP
GLUCOSE SERPL-MCNC: 102 MG/DL — HIGH (ref 70–99)
HCT VFR BLD CALC: 40.5 % — SIGNIFICANT CHANGE UP (ref 34.5–45)
HGB BLD-MCNC: 12.4 G/DL — SIGNIFICANT CHANGE UP (ref 11.5–15.5)
IMM GRANULOCYTES NFR BLD AUTO: 0.5 % — SIGNIFICANT CHANGE UP (ref 0–0.9)
LYMPHOCYTES # BLD AUTO: 1.71 K/UL — SIGNIFICANT CHANGE UP (ref 1–3.3)
LYMPHOCYTES # BLD AUTO: 17.7 % — SIGNIFICANT CHANGE UP (ref 13–44)
MCHC RBC-ENTMCNC: 29.3 PG — SIGNIFICANT CHANGE UP (ref 27–34)
MCHC RBC-ENTMCNC: 30.6 G/DL — LOW (ref 32–36)
MCV RBC AUTO: 95.7 FL — SIGNIFICANT CHANGE UP (ref 80–100)
MONOCYTES # BLD AUTO: 0.68 K/UL — SIGNIFICANT CHANGE UP (ref 0–0.9)
MONOCYTES NFR BLD AUTO: 7 % — SIGNIFICANT CHANGE UP (ref 2–14)
NEUTROPHILS # BLD AUTO: 7.04 K/UL — SIGNIFICANT CHANGE UP (ref 1.8–7.4)
NEUTROPHILS NFR BLD AUTO: 72.8 % — SIGNIFICANT CHANGE UP (ref 43–77)
NRBC # BLD: 0 /100 WBCS — SIGNIFICANT CHANGE UP (ref 0–0)
PLATELET # BLD AUTO: 331 K/UL — SIGNIFICANT CHANGE UP (ref 150–400)
POTASSIUM SERPL-MCNC: 4 MMOL/L — SIGNIFICANT CHANGE UP (ref 3.5–5.3)
POTASSIUM SERPL-SCNC: 4 MMOL/L — SIGNIFICANT CHANGE UP (ref 3.5–5.3)
PROT SERPL-MCNC: 7.9 GM/DL — SIGNIFICANT CHANGE UP (ref 6–8.3)
RBC # BLD: 4.23 M/UL — SIGNIFICANT CHANGE UP (ref 3.8–5.2)
RBC # FLD: 14.5 % — SIGNIFICANT CHANGE UP (ref 10.3–14.5)
SARS-COV-2 RNA SPEC QL NAA+PROBE: SIGNIFICANT CHANGE UP
SODIUM SERPL-SCNC: 143 MMOL/L — SIGNIFICANT CHANGE UP (ref 135–145)
WBC # BLD: 9.68 K/UL — SIGNIFICANT CHANGE UP (ref 3.8–10.5)
WBC # FLD AUTO: 9.68 K/UL — SIGNIFICANT CHANGE UP (ref 3.8–10.5)

## 2022-12-10 PROCEDURE — 70498 CT ANGIOGRAPHY NECK: CPT | Mod: 26,MA

## 2022-12-10 PROCEDURE — 99285 EMERGENCY DEPT VISIT HI MDM: CPT

## 2022-12-10 PROCEDURE — 93010 ELECTROCARDIOGRAM REPORT: CPT

## 2022-12-10 PROCEDURE — 70496 CT ANGIOGRAPHY HEAD: CPT | Mod: 26,MA

## 2022-12-10 RX ORDER — VALACYCLOVIR 500 MG/1
1000 TABLET, FILM COATED ORAL ONCE
Refills: 0 | Status: DISCONTINUED | OUTPATIENT
Start: 2022-12-10 | End: 2022-12-10

## 2022-12-10 NOTE — ED ADULT TRIAGE NOTE - CHIEF COMPLAINT QUOTE
left side facial droop since yesterday . h/o brain tumor with left side weakness  and slurred speech. pt walks with a walker

## 2022-12-10 NOTE — ED PROVIDER NOTE - NS ED ROS FT
CONST: no fevers, no chills  EYES: no pain, no vision changes  ENT: no sore throat, no ear pain, no change in hearing, +facial droop  CV: no chest pain, no leg swelling  RESP: no shortness of breath, no cough  ABD: no abdominal pain, no nausea, no vomiting, no diarrhea  : no dysuria, no flank pain, no hematuria  MSK: no back pain, no extremity pain  NEURO: no headache or additional neurologic complaints  HEME: no easy bleeding  SKIN:  no rash

## 2022-12-10 NOTE — ED PROVIDER NOTE - OBJECTIVE STATEMENT
63 y/o F with PMH HTN, brain cyst s/p  shunt, presents to the ED with L facial droop. Per daughter, she talked to the patient on video chat yesterday morning and she did not have a facial droop. Last night patient was noted to have a facial droop by daughter. Patient has slurred speech at baseline but endorses she has been drooling more on the left. Patient denies any numbness, tingling, or weakness in other extremities. She denies headache. No changes in vision. Uses walker at baseline.

## 2022-12-10 NOTE — ED PROVIDER NOTE - CLINICAL SUMMARY MEDICAL DECISION MAKING FREE TEXT BOX
61 y/o F with PMH HTN, brain cyst s/p  shunt presenting to the ED with L facial droop. She is hypertensive upon arrival. She has +L facial droop with no other neuro deficit. Forehead appears spared. Given no other neuro deficit, likely bells palsy however will obtain CT/CTA to r/o CVA. Patient outside window for thrombolytics given sx ongoing x 24 hours.

## 2022-12-10 NOTE — ED ADULT NURSE NOTE - NSICDXPASTSURGICALHX_GEN_ALL_CORE_FT
PAST SURGICAL HISTORY:  Brain cyst 1991 removed    Brain cyst     H/O lumpectomy     S/P  shunt 1997

## 2022-12-10 NOTE — ED PROVIDER NOTE - NSFOLLOWUPINSTRUCTIONS_ED_ALL_ED_FT
You were seen in the ED and diagnosed with bell's palsy.    You should take prednisone and valtrex as prescribed.    Follow up with neurology in the office.    If you have new or worsening symptoms including numbness, tingling, weakness, please return emergently to the ED.

## 2022-12-10 NOTE — ED PROVIDER NOTE - PHYSICAL EXAMINATION
GENERAL: Awake, alert, NAD  HEENT: NC/AT, moist mucous membranes, PERRL, EOMI, +L facial droop, forehead not spared  LUNGS: CTAB, no wheezes or crackles   CARDIAC: RRR, no m/r/g  ABDOMEN: Soft, normal BS, non tender, non distended, no rebound, no guarding  BACK: No midline spinal tenderness, no CVA tenderness  EXT: No edema, no calf tenderness, 2+ DP pulses bilaterally, no deformities.  NEURO: A&Ox3. 5/5 strengths upper extremities, lower extremities with some weakness (at baseline)  SKIN: Warm and dry. No rash.  PSYCH: Normal affect.

## 2022-12-10 NOTE — ED PROVIDER NOTE - PATIENT PORTAL LINK FT
You can access the FollowMyHealth Patient Portal offered by [facility] by registering at the following website: [email or web address]. By joining [email or web address]’s FollowMyHealth portal, you will also be able to view your health information using other applications (apps) compatible with our system.

## 2022-12-10 NOTE — ED ADULT NURSE NOTE - NSICDXPASTMEDICALHX_GEN_ALL_CORE_FT
PAST MEDICAL HISTORY:  History of cyst of brain     HTN (hypertension)     Hypertension     OA (osteoarthritis)     Sleep apnea

## 2022-12-10 NOTE — ED PROVIDER NOTE - CARE PROVIDER_API CALL
Stacie Jorge  NEUROLOGY  [address] [place] [address]  Phone: [phone]  Fax: [phone]  Follow Up Time: 1-3 Days

## 2022-12-19 NOTE — ED ADULT NURSE NOTE - BEFAST EYES
no
No
V-Y Flap Text: The defect edges were debeveled with a #15 scalpel blade.  Given the location of the defect, shape of the defect and the proximity to free margins a V-Y flap was deemed most appropriate.  Using a sterile surgical marker, an appropriate advancement flap was drawn incorporating the defect and placing the expected incisions within the relaxed skin tension lines where possible.    The area thus outlined was incised deep to adipose tissue with a #15 scalpel blade.  The skin margins were undermined to an appropriate distance in all directions utilizing iris scissors.

## 2023-01-11 ENCOUNTER — APPOINTMENT (OUTPATIENT)
Dept: SURGICAL ONCOLOGY | Facility: CLINIC | Age: 63
End: 2023-01-11

## 2023-01-11 NOTE — ADDENDUM
[FreeTextEntry1] : I, Zakiya Gamble, acted solely as a scribe for Dr. Clarence Cabello on this date 01/11/2023.\par \par

## 2023-01-11 NOTE — ASSESSMENT
[FreeTextEntry1] : Left breast DCIS ER/AL+\par CABRERA\par Area of concern likely scarring secondary to lumpectomy\par Reassured patient that index of suspicion for recurrence is low\par Will get yearly breast imaging September 2021\par Pt wants genetic testing which was performed today \par RTO 3 months \par

## 2023-01-11 NOTE — PHYSICAL EXAM
[de-identified] : Left breast lumpectomy scar well healed. normal nodularity from scarring. us negative fo any solid or cystic lesions. no dominant masses or adenopathy bilaterally.

## 2023-01-11 NOTE — HISTORY OF PRESENT ILLNESS
[de-identified] : Patient is a 63 y/o female who presents for a follow-up visit. She is status post left breast lumpectomy on 1/19/21 for left breast DCIS, final pathology revealed no residual DCIS with biopsy site changes. (ER+/AL+)\par Oncotype Dx: 11\par Patient was previously on Anastrozole as per Dr. Painter, now switched to Exemestane. \par \par Her recent bilateral mammo/sono performed on 3/25/22 showed no mammographic or sonographic evidence of malignancy in either breast at this time. No evidence of changes of a suspicious nature in either breast since the prior images. (BI-RADS 2)\par \par Mammogram from 9/4/20 revealed suspicious calcifications in the upper inner quadrant. Stereotactic biopsy is recommended.  BI-RADS Category 4: suspicious\par \par Pathology report 10/7/20:\par Breast, core biopsies-\par A. Ductal carcinoma in situ, high grade, cribriform and micropapillary types, associated with calcifications.\par Fibrocystic changes associated with focal calcifications.\par B. Fibrocystic changes associated with calcium oxalate. \par \par No prior breast biopsies.\par PMHx: Past brain stem surgery \par Family history of HTN\par Family history of breast cancer- 2nd cousin

## 2023-01-31 ENCOUNTER — APPOINTMENT (OUTPATIENT)
Dept: HEMATOLOGY ONCOLOGY | Facility: CLINIC | Age: 63
End: 2023-01-31

## 2023-02-07 ENCOUNTER — OUTPATIENT (OUTPATIENT)
Dept: OUTPATIENT SERVICES | Facility: HOSPITAL | Age: 63
LOS: 1 days | Discharge: HOME | End: 2023-02-07

## 2023-02-07 DIAGNOSIS — Z15.09 GENETIC SUSCEPTIBILITY TO OTHER MALIGNANT NEOPLASM: ICD-10-CM

## 2023-02-07 DIAGNOSIS — Z98.2 PRESENCE OF CEREBROSPINAL FLUID DRAINAGE DEVICE: Chronic | ICD-10-CM

## 2023-02-07 DIAGNOSIS — Z98.890 OTHER SPECIFIED POSTPROCEDURAL STATES: Chronic | ICD-10-CM

## 2023-02-07 DIAGNOSIS — G93.0 CEREBRAL CYSTS: Chronic | ICD-10-CM

## 2023-02-14 ENCOUNTER — APPOINTMENT (OUTPATIENT)
Dept: HEMATOLOGY ONCOLOGY | Facility: CLINIC | Age: 63
End: 2023-02-14
Payer: MEDICARE

## 2023-02-14 PROCEDURE — 99203 OFFICE O/P NEW LOW 30 MIN: CPT | Mod: 95

## 2023-02-24 ENCOUNTER — APPOINTMENT (OUTPATIENT)
Dept: ORTHOPEDIC SURGERY | Facility: CLINIC | Age: 63
End: 2023-02-24
Payer: MEDICARE

## 2023-02-24 DIAGNOSIS — R20.2 ANESTHESIA OF SKIN: ICD-10-CM

## 2023-02-24 DIAGNOSIS — R20.0 ANESTHESIA OF SKIN: ICD-10-CM

## 2023-02-24 DIAGNOSIS — M18.12 UNILATERAL PRIMARY OSTEOARTHRITIS OF FIRST CARPOMETACARPAL JOINT, LEFT HAND: ICD-10-CM

## 2023-02-24 PROCEDURE — 73110 X-RAY EXAM OF WRIST: CPT | Mod: LT

## 2023-02-24 PROCEDURE — 20600 DRAIN/INJ JOINT/BURSA W/O US: CPT | Mod: FA

## 2023-02-24 PROCEDURE — 99214 OFFICE O/P EST MOD 30 MIN: CPT | Mod: 25

## 2023-02-24 PROCEDURE — SPL01: CPT | Mod: LT,25

## 2023-02-27 ENCOUNTER — APPOINTMENT (OUTPATIENT)
Dept: ORTHOPEDIC SURGERY | Facility: CLINIC | Age: 63
End: 2023-02-27
Payer: MEDICARE

## 2023-02-27 VITALS
BODY MASS INDEX: 39.99 KG/M2 | OXYGEN SATURATION: 94 % | DIASTOLIC BLOOD PRESSURE: 82 MMHG | HEIGHT: 65 IN | WEIGHT: 240 LBS | HEART RATE: 88 BPM | SYSTOLIC BLOOD PRESSURE: 153 MMHG

## 2023-02-27 PROCEDURE — 99214 OFFICE O/P EST MOD 30 MIN: CPT | Mod: 25

## 2023-02-27 PROCEDURE — 20611 DRAIN/INJ JOINT/BURSA W/US: CPT | Mod: RT

## 2023-03-08 NOTE — ADDENDUM
[FreeTextEntry1] : 02/21/2023\par Left voicemail for patient requesting call back to follow-up on the items discussed at her Genetics visit. \par \par Hamilton Armstrong MS, [facility]\par Genetic Counselor

## 2023-03-08 NOTE — ASSESSMENT
[FreeTextEntry1] : The visit was provided via telehealth using real-time 2-way audio visual technology. The patient, Keila Martin, was located at home, [address] [place] [address], at the time of the visit. The Genetic Counselor, Hamilton Armstrong, and the Physician, Dr. Sacha Martinez, were located at the medical office located in [place], [place] at the time of the visit. The patient, Keila Martin, and Provider participated in the telehealth encounter. Her son-in-law and daughter also participated. Consent for telehealth services was given on 2023 by the patient, Keila Martin. \par \par REASON FOR CONSULT\par Keila Martin is a 62-year-old female who was referred by Dr. 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 She was accompanied by her son-in-law Codey, and then later in the call her daughter also joined. \par \par RELEVANT MEDICAL HISTORY\par Ms. Martin was diagnosed with left breast cancer in 10/2020 at age 60. She was treated with lumpectomy 2021, Anastrozole briefly and switched to Exemestane (plan for 5 years). Patient declined radiation therapy at that time. \par \par Ms. 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 This test was ordered by Dr. Cabello and reported on 2021. 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 \par \par OTHER MEDICAL AND SURGICAL HISTORY:\par Arthritis (left knee). HTN. \par \par PAST OB/GYN HISTORY:\par Obstetrical History: \par Age at Menarche: 11\par Menopausal with LMP at age [age]\par Age at First Live Birth: 19\par Oral Contraceptive Use: No\par Hormone Replacement Therapy: No\par \par CANCER SCREENING HISTORY:  \par Breast: Last mammogram and sonogram 2022, heterogeneously dense breasts noted, otherwise normal. Frequency: yearly. \par GYN: Last visit , normal. Frequency: yearly. \par Colon: None. \par Skin: Last exam 10/2022, noncancerous lesion reportedly removed from stomach. She has a follow-up appointment in 2023. \par \par SOCIAL HISTORY:\par •	Tobacco-product use: No\par \par FAMILY HISTORY:\par Maternal ancestry was reported as Swiss and [place], and paternal ancestry was reported as [place] and Chinese. No Ashkenazi Sikh heritage was reported. A detailed family history of cancer was ascertained, see below and scanned chart for pedigree. \par \par To Ms. Martin’s knowledge, no one in the family has had germline testing for cancer susceptibility.  \par RESULTS INTERPRETATION AND ASSESSMENT:\par We reviewed with Ms. 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 This variant was detected at an allele frequency of 20-30%. It is unclear at this time if Ms. Martin has truly mosaic Li-Fraumeni Syndrome (LFS) or if this mosaic TP53 is a result of clonal hematopoiesis of indeterminate potential (CHIP) or an underlying pre-leukemic or leukemic state.\par \par In order to clarify whether Ms. Martin has germline mosaic LFS or not, we discussed testing an additional sample type via skin biopsy. If a skin biopsy is pursued and the same TP53 mutation is identified, we would confirm constitutional mosaicism. However, if the skin biopsy is negative, we would think the TP53 mutation originally detected is most likely due to CHIP. Ms. Martin noted that she is unlikely to pursue a skin biopsy but will discuss it with her family further and let us know once she has made a decision. \par \par IMPLICATIONS FOR FAMILY MEMBERS:\par We recommended Ms. Martin’s son and daughter pursue genetic testing for TP53 as there may be up to a 50% chance that they have the same gene mutation if the TP53 mutation is truly mosaic in Ms. Martin. Ms. Martin’s daughter who joined the call later on stated they will discuss as a family and let us know whether they would like to pursue genetic testing. We emphasized the importance of knowing whether her children have a germline TP53 mutation as it will significantly impact their medical management. \par \par We will reach out to Ms. Martin next week to see if they have decided regarding the punch biopsy and genetic testing for her children. \par \par PLAN:\par 1.	See above note for recommended management.\par 2.	We will follow-up with Ms. Martin in one week once they have had a chance as a family to discuss the information provided today. \par \par For any additional questions please call Cancer Genetics at [phone]. \par \par \par Hamilton Armstrong, MS, [facility]\par Genetic Counselor, Cancer Genetics\par \par \par Attending Attestation:\par \par I have reviewed and edited the genetic counselor's note and I agree with the assessment and plan as documented. I spent approximately 30 minutes in total time of which approximately 15 minutes was face-to-face (via 2-way audiovisual telemedicine connection) with Ms. Martin reviewing her relevant personal and family history, the genetic testing results, our risk-assessment, and options for future cancer risk-reduction for the patient and her relevant family members. Over half this time was spent in counseling and coordination of care.\par \par Sacha Martinez MD\par Chief, Cancer Genetics\par [facility] [place]\par \par \par CC: \par Patient\par Dr. Clarence Cabello

## 2023-04-12 ENCOUNTER — OUTPATIENT (OUTPATIENT)
Dept: OUTPATIENT SERVICES | Facility: HOSPITAL | Age: 63
LOS: 1 days | End: 2023-04-12
Payer: COMMERCIAL

## 2023-04-12 ENCOUNTER — APPOINTMENT (OUTPATIENT)
Dept: MAMMOGRAPHY | Facility: IMAGING CENTER | Age: 63
End: 2023-04-12
Payer: MEDICARE

## 2023-04-12 ENCOUNTER — RESULT REVIEW (OUTPATIENT)
Age: 63
End: 2023-04-12

## 2023-04-12 ENCOUNTER — APPOINTMENT (OUTPATIENT)
Dept: ULTRASOUND IMAGING | Facility: IMAGING CENTER | Age: 63
End: 2023-04-12

## 2023-04-12 DIAGNOSIS — Z98.2 PRESENCE OF CEREBROSPINAL FLUID DRAINAGE DEVICE: Chronic | ICD-10-CM

## 2023-04-12 DIAGNOSIS — D05.12 INTRADUCTAL CARCINOMA IN SITU OF LEFT BREAST: ICD-10-CM

## 2023-04-12 DIAGNOSIS — G93.0 CEREBRAL CYSTS: Chronic | ICD-10-CM

## 2023-04-12 DIAGNOSIS — Z98.890 OTHER SPECIFIED POSTPROCEDURAL STATES: Chronic | ICD-10-CM

## 2023-04-12 PROCEDURE — 76641 ULTRASOUND BREAST COMPLETE: CPT

## 2023-04-12 PROCEDURE — 77066 DX MAMMO INCL CAD BI: CPT | Mod: 26

## 2023-04-12 PROCEDURE — 76641 ULTRASOUND BREAST COMPLETE: CPT | Mod: 26,50

## 2023-04-12 PROCEDURE — G0279: CPT

## 2023-04-12 PROCEDURE — 77066 DX MAMMO INCL CAD BI: CPT

## 2023-04-12 PROCEDURE — G0279: CPT | Mod: 26

## 2023-05-04 ENCOUNTER — OUTPATIENT (OUTPATIENT)
Dept: OUTPATIENT SERVICES | Facility: HOSPITAL | Age: 63
LOS: 1 days | Discharge: HOME | End: 2023-05-04

## 2023-05-04 DIAGNOSIS — Z15.09 GENETIC SUSCEPTIBILITY TO OTHER MALIGNANT NEOPLASM: ICD-10-CM

## 2023-05-04 DIAGNOSIS — Z98.890 OTHER SPECIFIED POSTPROCEDURAL STATES: Chronic | ICD-10-CM

## 2023-05-04 DIAGNOSIS — Z98.2 PRESENCE OF CEREBROSPINAL FLUID DRAINAGE DEVICE: Chronic | ICD-10-CM

## 2023-05-04 DIAGNOSIS — G93.0 CEREBRAL CYSTS: Chronic | ICD-10-CM

## 2023-05-15 ENCOUNTER — APPOINTMENT (OUTPATIENT)
Dept: HEMATOLOGY ONCOLOGY | Facility: CLINIC | Age: 63
End: 2023-05-15
Payer: MEDICARE

## 2023-05-15 VITALS
TEMPERATURE: 98.1 F | WEIGHT: 281.31 LBS | DIASTOLIC BLOOD PRESSURE: 72 MMHG | HEIGHT: 65 IN | RESPIRATION RATE: 16 BRPM | SYSTOLIC BLOOD PRESSURE: 130 MMHG | HEART RATE: 100 BPM | BODY MASS INDEX: 46.87 KG/M2 | OXYGEN SATURATION: 97 %

## 2023-05-15 DIAGNOSIS — Z79.899 OTHER LONG TERM (CURRENT) DRUG THERAPY: ICD-10-CM

## 2023-05-15 PROCEDURE — 99214 OFFICE O/P EST MOD 30 MIN: CPT

## 2023-05-15 NOTE — ASSESSMENT
[FreeTextEntry1] : 61 y/o woman with HTN, prior brainstem tumor 1991 resected, OA, now with L DCIS ER+IL+ s/p lumpectomy 1/19/21, doing well, presenting for follow up on adjuvant AI, doing well. Started 4/2021\par \par -continue exemestane 25 mg daily, doing well.\par - recommend glucosamine/chondroiten for arthralgias\par -continue to follow up with her breast surgeon Dr. 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

## 2023-05-15 NOTE — HISTORY OF PRESENT ILLNESS
[Disease: _____________________] : Disease: [unfilled] [T: ___] : T[unfilled] [AJCC Stage: ____] : AJCC Stage: [unfilled] [de-identified] : The patient had a routine mammogram and sonogram on 7/10/20 with additional imaging left breast on 9/4/20 that showed suspicious calcifications in the upper inner quadrant. L 10:00 biopsy DCIS %/TX 80%+.\par \par Pathology report 10/7/20:\par Breast, core biopsies-\par A. Ductal carcinoma in situ, high grade, cribriform and micropapillary types, associated with calcifications.\par Fibrocystic changes associated with focal calcifications.\par B. Fibrocystic changes associated with calcium oxalate. \par \par She is status post left breast lumpectomy with Dr. Clarence Cabello on 1/19/21 for DCIS.  \par Final pathology revealed no residual DCIS with biopsy site changes.\par \par The patient met with radiation oncologist, low DCISion RT, therefore patient chose to defer radiation therapy.  \par \par Anastrozole started 4/2021 and then switched to exemestane b/c of dizziness and arthralgias. \par \par Family history of breast cancer- 2nd cousin. [facility]AE genetic testing 8/6/21  showed VUS of TP53, possibly mosaic , c.404G>T ([id number]). [de-identified] : ER+ (100%)/NV+ (80%) [de-identified] : 5/15/2023\par Patient presents today to rule out metastatic breast cancer and assess treatment toxicity - exemestane\par Started endocrine therapy 4/2021, switched to exemestane since 8/12/21.  She states she is tolerating well with no complaints except for bilateral knee pain. \par Left knee osteoarthritis slightly worse, was recommended to have knee replacement - will not be doing; s/p cortisone injection\par mammo/US 4/21/2023 BIRADS2\par Labs done w/ Pcp in April 2023 - patient requsets we review pcp bw\par BMD 7/16/21 normal\par \par Seen in February of 2023 by Dr. Martinez in Cancer genetics for possibly mosaic TP53 mutation - she was recommended to consider skin biopsy and/or testing of her children. She states that she has opted not to proceed with skin biopsy and her children are not interested in genetic testing. \par \par HEALTH MAINTENANCE \par PCP Dr. Ramires, following for HTN\par ORTHO OA - gets knee injection every 6 months, not taking any oral medications for this currently\par Gyn: Dr. Sears - sees annually, no vaginal bleeding or spotting\par GI Dr. 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 LMP: "years ago"\par s/p COVID vaccine and booster

## 2023-05-15 NOTE — PHYSICAL EXAM
[Ambulatory and capable of all self care but unable to carry out any work activities] : Status 2- Ambulatory and capable of all self care but unable to carry out any work activities. Up and about more than 50% of waking hours [Obese] : obese [Normal] : normoactive bowel sounds, soft and nontender, no hepatosplenomegaly or masses appreciated [de-identified] : right upper chest wall:  shunt noted; very large pendulous breasts  [de-identified] : morbidly obese difficult to exam

## 2023-05-15 NOTE — REVIEW OF SYSTEMS
[Joint Pain] : joint pain [Negative] : Allergic/Immunologic [FreeTextEntry9] : bilateral knee pain - chronic

## 2023-11-03 ENCOUNTER — OUTPATIENT (OUTPATIENT)
Dept: OUTPATIENT SERVICES | Facility: HOSPITAL | Age: 63
LOS: 1 days | Discharge: HOME | End: 2023-11-03

## 2023-11-03 DIAGNOSIS — G93.0 CEREBRAL CYSTS: Chronic | ICD-10-CM

## 2023-11-03 DIAGNOSIS — Z98.890 OTHER SPECIFIED POSTPROCEDURAL STATES: Chronic | ICD-10-CM

## 2023-11-03 DIAGNOSIS — Z15.09 GENETIC SUSCEPTIBILITY TO OTHER MALIGNANT NEOPLASM: ICD-10-CM

## 2023-11-03 DIAGNOSIS — Z98.2 PRESENCE OF CEREBROSPINAL FLUID DRAINAGE DEVICE: Chronic | ICD-10-CM

## 2023-11-14 ENCOUNTER — APPOINTMENT (OUTPATIENT)
Dept: HEMATOLOGY ONCOLOGY | Facility: CLINIC | Age: 63
End: 2023-11-14

## 2023-11-27 ENCOUNTER — APPOINTMENT (OUTPATIENT)
Dept: ORTHOPEDIC SURGERY | Facility: CLINIC | Age: 63
End: 2023-11-27
Payer: MEDICARE

## 2023-11-27 PROCEDURE — 99214 OFFICE O/P EST MOD 30 MIN: CPT | Mod: 25

## 2023-11-27 PROCEDURE — 20611 DRAIN/INJ JOINT/BURSA W/US: CPT | Mod: 50

## 2023-11-27 RX ORDER — HYALURONATE SODIUM, STABILIZED 88 MG/4 ML
88 SYRINGE (ML) INTRAARTICULAR
Qty: 2 | Refills: 0 | Status: ACTIVE | COMMUNITY
Start: 2023-11-27

## 2023-12-27 ENCOUNTER — APPOINTMENT (OUTPATIENT)
Dept: ORTHOPEDIC SURGERY | Facility: CLINIC | Age: 63
End: 2023-12-27
Payer: MEDICARE

## 2023-12-27 PROCEDURE — 99214 OFFICE O/P EST MOD 30 MIN: CPT | Mod: 25

## 2023-12-27 PROCEDURE — 20611 DRAIN/INJ JOINT/BURSA W/US: CPT | Mod: 50

## 2024-02-19 NOTE — H&P PST ADULT - MAMMOGRAM, RESULTS OF LAST, PROFILE
Quality 110: Preventive Care And Screening: Influenza Immunization: Influenza Immunization Administered during Influenza season
Quality 130: Documentation Of Current Medications In The Medical Record: Current Medications Documented
Detail Level: Detailed
Quality 226: Preventive Care And Screening: Tobacco Use: Screening And Cessation Intervention: Patient screened for tobacco use and is an ex/non-smoker
abnormal, current condition

## 2024-04-16 DIAGNOSIS — D05.12 INTRADUCTAL CARCINOMA IN SITU OF LEFT BREAST: ICD-10-CM

## 2024-04-17 ENCOUNTER — OUTPATIENT (OUTPATIENT)
Dept: OUTPATIENT SERVICES | Facility: HOSPITAL | Age: 64
LOS: 1 days | Discharge: HOME | End: 2024-04-17

## 2024-04-17 DIAGNOSIS — Z98.890 OTHER SPECIFIED POSTPROCEDURAL STATES: Chronic | ICD-10-CM

## 2024-04-17 DIAGNOSIS — Z15.09 GENETIC SUSCEPTIBILITY TO OTHER MALIGNANT NEOPLASM: ICD-10-CM

## 2024-04-17 DIAGNOSIS — Z98.2 PRESENCE OF CEREBROSPINAL FLUID DRAINAGE DEVICE: Chronic | ICD-10-CM

## 2024-04-17 DIAGNOSIS — G93.0 CEREBRAL CYSTS: Chronic | ICD-10-CM

## 2024-04-23 ENCOUNTER — APPOINTMENT (OUTPATIENT)
Dept: MAMMOGRAPHY | Facility: IMAGING CENTER | Age: 64
End: 2024-04-23

## 2024-04-23 ENCOUNTER — APPOINTMENT (OUTPATIENT)
Dept: ULTRASOUND IMAGING | Facility: IMAGING CENTER | Age: 64
End: 2024-04-23

## 2024-04-25 ENCOUNTER — APPOINTMENT (OUTPATIENT)
Dept: HEMATOLOGY ONCOLOGY | Facility: CLINIC | Age: 64
End: 2024-04-25

## 2024-05-06 ENCOUNTER — APPOINTMENT (OUTPATIENT)
Dept: RADIOLOGY | Facility: IMAGING CENTER | Age: 64
End: 2024-05-06

## 2024-05-13 ENCOUNTER — NON-APPOINTMENT (OUTPATIENT)
Age: 64
End: 2024-05-13

## 2024-05-16 ENCOUNTER — APPOINTMENT (OUTPATIENT)
Dept: ULTRASOUND IMAGING | Facility: CLINIC | Age: 64
End: 2024-05-16

## 2024-05-16 ENCOUNTER — APPOINTMENT (OUTPATIENT)
Dept: MAMMOGRAPHY | Facility: CLINIC | Age: 64
End: 2024-05-16

## 2024-05-20 ENCOUNTER — APPOINTMENT (OUTPATIENT)
Dept: ORTHOPEDIC SURGERY | Facility: CLINIC | Age: 64
End: 2024-05-20

## 2024-05-20 VITALS
OXYGEN SATURATION: 93 % | HEIGHT: 64 IN | WEIGHT: 240 LBS | HEART RATE: 87 BPM | BODY MASS INDEX: 40.97 KG/M2 | DIASTOLIC BLOOD PRESSURE: 80 MMHG | SYSTOLIC BLOOD PRESSURE: 138 MMHG

## 2024-05-20 DIAGNOSIS — M17.0 BILATERAL PRIMARY OSTEOARTHRITIS OF KNEE: ICD-10-CM

## 2024-05-20 PROCEDURE — 20611 DRAIN/INJ JOINT/BURSA W/US: CPT | Mod: 50

## 2024-05-20 PROCEDURE — 99213 OFFICE O/P EST LOW 20 MIN: CPT | Mod: 25

## 2024-05-20 RX ORDER — HYALURONATE SODIUM, STABILIZED 88 MG/4 ML
88 SYRINGE (ML) INTRAARTICULAR
Qty: 2 | Refills: 0 | Status: ACTIVE | COMMUNITY
Start: 2024-05-20 | End: 1900-01-01

## 2024-05-21 PROBLEM — M17.0 PRIMARY OSTEOARTHRITIS OF BOTH KNEES: Status: ACTIVE | Noted: 2022-06-20

## 2024-06-24 RX ORDER — EXEMESTANE 25 MG/1
25 TABLET, FILM COATED ORAL
Qty: 30 | Refills: 5 | Status: ACTIVE | COMMUNITY
Start: 2021-05-20 | End: 1900-01-01

## 2024-07-08 ENCOUNTER — APPOINTMENT (OUTPATIENT)
Dept: ULTRASOUND IMAGING | Facility: IMAGING CENTER | Age: 64
End: 2024-07-08
Payer: MEDICARE

## 2024-07-08 ENCOUNTER — RESULT REVIEW (OUTPATIENT)
Age: 64
End: 2024-07-08

## 2024-07-08 ENCOUNTER — APPOINTMENT (OUTPATIENT)
Dept: MAMMOGRAPHY | Facility: IMAGING CENTER | Age: 64
End: 2024-07-08
Payer: MEDICARE

## 2024-07-08 ENCOUNTER — APPOINTMENT (OUTPATIENT)
Dept: ORTHOPEDIC SURGERY | Facility: CLINIC | Age: 64
End: 2024-07-08
Payer: MEDICARE

## 2024-07-08 ENCOUNTER — APPOINTMENT (OUTPATIENT)
Dept: RADIOLOGY | Facility: IMAGING CENTER | Age: 64
End: 2024-07-08
Payer: MEDICARE

## 2024-07-08 ENCOUNTER — OUTPATIENT (OUTPATIENT)
Dept: OUTPATIENT SERVICES | Facility: HOSPITAL | Age: 64
LOS: 1 days | End: 2024-07-08
Payer: COMMERCIAL

## 2024-07-08 DIAGNOSIS — D05.12 INTRADUCTAL CARCINOMA IN SITU OF LEFT BREAST: ICD-10-CM

## 2024-07-08 DIAGNOSIS — G93.0 CEREBRAL CYSTS: Chronic | ICD-10-CM

## 2024-07-08 DIAGNOSIS — M17.0 BILATERAL PRIMARY OSTEOARTHRITIS OF KNEE: ICD-10-CM

## 2024-07-08 DIAGNOSIS — Z98.2 PRESENCE OF CEREBROSPINAL FLUID DRAINAGE DEVICE: Chronic | ICD-10-CM

## 2024-07-08 DIAGNOSIS — Z98.890 OTHER SPECIFIED POSTPROCEDURAL STATES: Chronic | ICD-10-CM

## 2024-07-08 PROCEDURE — G0279: CPT

## 2024-07-08 PROCEDURE — 77080 DXA BONE DENSITY AXIAL: CPT

## 2024-07-08 PROCEDURE — 20611 DRAIN/INJ JOINT/BURSA W/US: CPT | Mod: 50

## 2024-07-08 PROCEDURE — 77080 DXA BONE DENSITY AXIAL: CPT | Mod: 26

## 2024-07-08 PROCEDURE — 76641 ULTRASOUND BREAST COMPLETE: CPT | Mod: 26,50

## 2024-07-08 PROCEDURE — G0279: CPT | Mod: 26

## 2024-07-08 PROCEDURE — 77066 DX MAMMO INCL CAD BI: CPT | Mod: 26

## 2024-07-08 PROCEDURE — 77066 DX MAMMO INCL CAD BI: CPT

## 2024-07-08 PROCEDURE — 76641 ULTRASOUND BREAST COMPLETE: CPT

## 2024-12-18 NOTE — H&P PST ADULT - BREASTS DETAILS
0 (no pain/absence of nonverbal indicators of pain)
no palpable mass/no mass/no tenderness/nipples normal/no discharge or discoloration

## 2025-03-07 ENCOUNTER — APPOINTMENT (OUTPATIENT)
Dept: ORTHOPEDIC SURGERY | Facility: CLINIC | Age: 65
End: 2025-03-07

## 2025-06-23 NOTE — ADDENDUM
[FreeTextEntry1] : I, Zakiya Gamble, acted solely as a scribe for Dr. Clarence Cabello on this date 11/13/2020.\par  Photo Preface (Leave Blank If You Do Not Want): Photographs were obtained today Detail Level: Zone